# Patient Record
Sex: MALE | Race: WHITE | Employment: FULL TIME | URBAN - METROPOLITAN AREA
[De-identification: names, ages, dates, MRNs, and addresses within clinical notes are randomized per-mention and may not be internally consistent; named-entity substitution may affect disease eponyms.]

---

## 2017-02-17 NOTE — TELEPHONE ENCOUNTER
Refill Protocol Appointment Criteria  · Appointment scheduled in the past 6 months or in the next 3 months  Recent Visits       Provider Department Primary Dx    3 months ago Alesia Black MD [facility], [facility], [facility] Colon cancer scr

## 2017-06-19 ENCOUNTER — OFFICE VISIT (OUTPATIENT)
Dept: INTERNAL MEDICINE CLINIC | Facility: CLINIC | Age: 52
End: 2017-06-19

## 2017-06-19 VITALS
SYSTOLIC BLOOD PRESSURE: 149 MMHG | HEART RATE: 51 BPM | WEIGHT: 195 LBS | HEIGHT: 64.25 IN | BODY MASS INDEX: 33.29 KG/M2 | DIASTOLIC BLOOD PRESSURE: 94 MMHG

## 2017-06-19 DIAGNOSIS — R30.0 DYSURIA: ICD-10-CM

## 2017-06-19 DIAGNOSIS — Z12.11 COLON CANCER SCREENING: ICD-10-CM

## 2017-06-19 DIAGNOSIS — I51.89 DIASTOLIC DYSFUNCTION: Primary | ICD-10-CM

## 2017-06-19 DIAGNOSIS — Z00.00 ROUTINE ADULT HEALTH MAINTENANCE: ICD-10-CM

## 2017-06-19 DIAGNOSIS — Z91.09 POLLEN ALLERGIES: ICD-10-CM

## 2017-06-19 DIAGNOSIS — R53.81 MALAISE AND FATIGUE: ICD-10-CM

## 2017-06-19 DIAGNOSIS — R53.83 MALAISE AND FATIGUE: ICD-10-CM

## 2017-06-19 PROCEDURE — 99212 OFFICE O/P EST SF 10 MIN: CPT | Performed by: INTERNAL MEDICINE

## 2017-06-19 PROCEDURE — 99213 OFFICE O/P EST LOW 20 MIN: CPT | Performed by: INTERNAL MEDICINE

## 2017-06-19 RX ORDER — ASPIRIN 81 MG
TABLET, DELAYED RELEASE (ENTERIC COATED) ORAL
Refills: 1 | COMMUNITY
Start: 2017-04-18 | End: 2017-06-19

## 2017-06-19 NOTE — PROGRESS NOTES
F/u  Patient Active Problem List:     Pre-syncope     Chest pain     Diastolic dysfunction     Hypercholesterolemia     Pollen allergies     Malaise and fatigue    C/o fatigue p working 12 h d  C/o bad allergies takes calritin no relief   Did not do screen

## 2017-09-12 RX ORDER — ASPIRIN 81 MG
TABLET, DELAYED RELEASE (ENTERIC COATED) ORAL
Qty: 100 TABLET | Refills: 0 | OUTPATIENT
Start: 2017-09-12

## 2017-09-12 RX ORDER — METOPROLOL TARTRATE 50 MG/1
TABLET, FILM COATED ORAL
Qty: 60 TABLET | Refills: 2 | OUTPATIENT
Start: 2017-09-12

## 2017-10-13 RX ORDER — METOPROLOL TARTRATE 50 MG/1
TABLET, FILM COATED ORAL
Qty: 60 TABLET | Refills: 2 | OUTPATIENT
Start: 2017-10-13

## 2017-11-06 RX ORDER — ATORVASTATIN CALCIUM 10 MG/1
10 TABLET, FILM COATED ORAL DAILY
Qty: 30 TABLET | Refills: 0 | Status: SHIPPED | OUTPATIENT
Start: 2017-11-06 | End: 2017-12-01

## 2017-11-06 NOTE — TELEPHONE ENCOUNTER
Please call patient to inform the medications is refilled and to make appointment in 1 month for further refills.

## 2017-11-20 ENCOUNTER — TELEPHONE (OUTPATIENT)
Dept: INTERNAL MEDICINE CLINIC | Facility: CLINIC | Age: 52
End: 2017-11-20

## 2017-11-20 RX ORDER — METOPROLOL TARTRATE 50 MG/1
50 TABLET, FILM COATED ORAL DAILY
Qty: 30 TABLET | Refills: 0 | Status: SHIPPED | OUTPATIENT
Start: 2017-11-20 | End: 2017-12-01

## 2017-11-20 NOTE — TELEPHONE ENCOUNTER
30 day sent until seen per protocol. Left message on personal machine that the medication was sent to the pharmacy.       Hypertensive Medications  Protocol Criteria:  · Appointment scheduled in the past 6 months or in the next 3 months  · BMP or CMP in t

## 2017-11-20 NOTE — TELEPHONE ENCOUNTER
Pt is calling to request a refill for medication and states almost out of medication, has 6 days left.  Pt states wasn't aware of he had to come for a 6 month F/U and this is a busy year for him for work can he get a 1 month supply until he is able to get a

## 2017-12-01 ENCOUNTER — OFFICE VISIT (OUTPATIENT)
Dept: INTERNAL MEDICINE CLINIC | Facility: CLINIC | Age: 52
End: 2017-12-01

## 2017-12-01 VITALS
SYSTOLIC BLOOD PRESSURE: 132 MMHG | DIASTOLIC BLOOD PRESSURE: 88 MMHG | HEIGHT: 64.25 IN | HEART RATE: 54 BPM | WEIGHT: 199.19 LBS | TEMPERATURE: 99 F | BODY MASS INDEX: 34.01 KG/M2

## 2017-12-01 DIAGNOSIS — E78.2 MIXED HYPERLIPIDEMIA: ICD-10-CM

## 2017-12-01 DIAGNOSIS — I10 BENIGN ESSENTIAL HYPERTENSION: Primary | ICD-10-CM

## 2017-12-01 DIAGNOSIS — R73.01 ABNORMAL FASTING GLUCOSE: ICD-10-CM

## 2017-12-01 DIAGNOSIS — E66.9 OBESITY (BMI 30-39.9): ICD-10-CM

## 2017-12-01 PROCEDURE — 99212 OFFICE O/P EST SF 10 MIN: CPT | Performed by: INTERNAL MEDICINE

## 2017-12-01 PROCEDURE — 99214 OFFICE O/P EST MOD 30 MIN: CPT | Performed by: INTERNAL MEDICINE

## 2017-12-01 RX ORDER — ATORVASTATIN CALCIUM 10 MG/1
10 TABLET, FILM COATED ORAL DAILY
Qty: 90 TABLET | Refills: 1 | Status: SHIPPED | OUTPATIENT
Start: 2017-12-01 | End: 2017-12-31

## 2017-12-01 RX ORDER — METOPROLOL TARTRATE 50 MG/1
50 TABLET, FILM COATED ORAL DAILY
Qty: 90 TABLET | Refills: 1 | Status: SHIPPED | OUTPATIENT
Start: 2017-12-01 | End: 2018-01-19

## 2017-12-01 NOTE — PROGRESS NOTES
Chance Rust is a 45year old male. Patient presents with:  Hypertension  Hyperlipidemia      HPI:   Here to establish care, former patient of Dr Tonja Mercedes.   He has history of hypertension, hyperlipidemia, grade 1 diastolic dysfunction, obesity and fami heartburn, nausea or vomiting. : No pain on urination, change in the color of urine, discharge, urinating frequently. MUSK: No back pain, joint pain, muscle pain.   NEURO: denies tingling numbness or headaches  ENDO: No fatigue, polyuria, polydipsia, tr many times your body will feel that you are trying to starve yourself and therefore slow down your metabolism and  prevent you from losing weight. Also try and do some sort of aerobic exercise, such as brisk walks for 30 minutes 4-5 times per week.     Othe

## 2017-12-01 NOTE — PATIENT INSTRUCTIONS
Low-Salt Choices  Eating salt (sodium) can make your body retain too much water. Excess water makes your heart work harder. Canned, packaged, and frozen foods are easy to prepare. But they are often high in sodium.  Here are some ideas for low-salt foods

## 2017-12-19 NOTE — TELEPHONE ENCOUNTER
Pharmacy called to f/u on refill request   States they requested nothing in epic    aspirin 81 MG Oral Tab

## 2018-01-15 ENCOUNTER — NURSE TRIAGE (OUTPATIENT)
Dept: OTHER | Age: 53
End: 2018-01-15

## 2018-01-15 NOTE — TELEPHONE ENCOUNTER
Action Requested: Summary for Provider     []  Critical Lab, Recommendations Needed  [x] Need Additional Advice  []   FYI    []   Need Orders  [] Need Medications Sent to Pharmacy  []  Other     SUMMARY: patient's blood pressure this morning before medicat

## 2018-01-15 NOTE — TELEPHONE ENCOUNTER
Pt returned call. Reviewed recommendations with pt per doctor's instructions. Pt agreed with plan of care, states Dr. Santhosh Jesus is his PCP, appt scheduled 1/19/18.  Advised pt to call back sooner if any symptoms, pt agreed and had no further questions at this t

## 2018-01-15 NOTE — TELEPHONE ENCOUNTER
If it remains elevated in this range the BP would have to be adjusted. Please inform the patient to follow up with PCP within a week for adjusting the meds. please keep a log of blood pressure readings for next visit. Thank you.

## 2018-01-19 ENCOUNTER — OFFICE VISIT (OUTPATIENT)
Dept: INTERNAL MEDICINE CLINIC | Facility: CLINIC | Age: 53
End: 2018-01-19

## 2018-01-19 VITALS
SYSTOLIC BLOOD PRESSURE: 135 MMHG | TEMPERATURE: 99 F | HEART RATE: 50 BPM | DIASTOLIC BLOOD PRESSURE: 90 MMHG | HEIGHT: 64.25 IN | BODY MASS INDEX: 33.66 KG/M2 | WEIGHT: 197.19 LBS

## 2018-01-19 DIAGNOSIS — R73.01 ABNORMAL FASTING GLUCOSE: ICD-10-CM

## 2018-01-19 DIAGNOSIS — I10 ESSENTIAL HYPERTENSION: Primary | ICD-10-CM

## 2018-01-19 DIAGNOSIS — I51.89 DIASTOLIC DYSFUNCTION: ICD-10-CM

## 2018-01-19 DIAGNOSIS — T70.29XA ANOXIA DUE TO HIGH ALTITUDE, INITIAL ENCOUNTER: ICD-10-CM

## 2018-01-19 PROCEDURE — 99212 OFFICE O/P EST SF 10 MIN: CPT | Performed by: INTERNAL MEDICINE

## 2018-01-19 PROCEDURE — 99214 OFFICE O/P EST MOD 30 MIN: CPT | Performed by: INTERNAL MEDICINE

## 2018-01-19 RX ORDER — ACETAZOLAMIDE 125 MG/1
125 TABLET ORAL 2 TIMES DAILY
Qty: 6 TABLET | Refills: 0 | Status: SHIPPED | OUTPATIENT
Start: 2018-01-19 | End: 2018-01-22

## 2018-01-19 RX ORDER — ATORVASTATIN CALCIUM 10 MG/1
10 TABLET, FILM COATED ORAL NIGHTLY
COMMUNITY
Start: 2018-01-05 | End: 2018-05-30

## 2018-01-19 RX ORDER — LISINOPRIL 20 MG/1
20 TABLET ORAL DAILY
Qty: 30 TABLET | Refills: 0 | Status: SHIPPED | OUTPATIENT
Start: 2018-01-19 | End: 2018-02-02

## 2018-01-19 NOTE — PROGRESS NOTES
Phuong Bourgeois is a 45year old male. Patient presents with:  Hypertension  Headache      HPI:   Here for follow up of BP , his blood pressure has been running high for last 2-3 weeks. He reports sore throat and cold symptoms 2 weeks ago.  He has been ex Types: Cigarettes     Quit date: 1/1/2003  Smokeless tobacco: Never Used                      Alcohol use: Yes           0.0 oz/week     Comment: 5 drinks/week       REVIEW OF SYSTEMS:   GENERAL HEALTH: No fevers, chills, sweats, fatigue.   VISION: Gets bhavesh dose up if needed for blood pressure control. Side effects of the lisinopril including dry cough, hyperkalemia and angioedema explained to the patient. Abnormal fasting glucose  Checking hemoglobin A1c.   Patient with increased waist circumference and

## 2018-02-02 ENCOUNTER — OFFICE VISIT (OUTPATIENT)
Dept: INTERNAL MEDICINE CLINIC | Facility: CLINIC | Age: 53
End: 2018-02-02

## 2018-02-02 VITALS
BODY MASS INDEX: 33.15 KG/M2 | DIASTOLIC BLOOD PRESSURE: 92 MMHG | SYSTOLIC BLOOD PRESSURE: 136 MMHG | TEMPERATURE: 98 F | WEIGHT: 194.19 LBS | HEIGHT: 64.25 IN

## 2018-02-02 DIAGNOSIS — R73.01 ABNORMAL FASTING GLUCOSE: ICD-10-CM

## 2018-02-02 DIAGNOSIS — K21.9 GASTROESOPHAGEAL REFLUX DISEASE, ESOPHAGITIS PRESENCE NOT SPECIFIED: ICD-10-CM

## 2018-02-02 DIAGNOSIS — I10 ESSENTIAL HYPERTENSION: Primary | ICD-10-CM

## 2018-02-02 PROCEDURE — 99212 OFFICE O/P EST SF 10 MIN: CPT | Performed by: INTERNAL MEDICINE

## 2018-02-02 PROCEDURE — 99214 OFFICE O/P EST MOD 30 MIN: CPT | Performed by: INTERNAL MEDICINE

## 2018-02-02 RX ORDER — NICOTINE POLACRILEX 4 MG/1
20 GUM, CHEWING ORAL DAILY
Qty: 60 TABLET | Refills: 3 | Status: SHIPPED | OUTPATIENT
Start: 2018-02-02 | End: 2018-04-03

## 2018-02-02 RX ORDER — LISINOPRIL 40 MG/1
40 TABLET ORAL DAILY
Qty: 90 TABLET | Refills: 3 | Status: SHIPPED | OUTPATIENT
Start: 2018-02-02 | End: 2019-01-23

## 2018-02-02 NOTE — PROGRESS NOTES
Danii Benoit is a 45year old male. Patient presents with:  Hypertension: f/u      HPI:   Here is her for BP follow-up. His BP meds were changed last visit, 2 weeks ago. He was on metoprolol 50 twice daily.   Due to uncontrolled hyper tension and ty lesions or rashes. RESPIRATORY: denies shortness of breath, wheezing dry cough present. CARDIOVASCULAR: denies chest pain on exertion, palpitations, swelling in feet. GI:   GERD present.   : No pain on urination, change in the color of urine, discharge prevent reflux. Lose weight. Limit aspirin and ibuprofen. Abnormal fasting glucose  Reminded the patient to do the HbA1c order that is given last visit    Other orders  -     Omeprazole 20 MG Oral Tab EC; Take 20 mg by mouth daily.   -     lisinopril 4

## 2018-02-02 NOTE — PATIENT INSTRUCTIONS
For BP  Advised low salt diet. Eat less of canned , frozen, dried, packaged and fast food. Limit caffeine, alcohol. No smoking. Exercise regularly, at least 20 minutes 3 times a week. Maintain healthy body weight. Check BP regularly. Avoid stress.

## 2018-05-30 RX ORDER — ATORVASTATIN CALCIUM 10 MG/1
10 TABLET, FILM COATED ORAL NIGHTLY
Qty: 90 TABLET | Refills: 3 | Status: SHIPPED | OUTPATIENT
Start: 2018-05-30 | End: 2019-02-04

## 2018-05-30 NOTE — TELEPHONE ENCOUNTER
Current Outpatient Prescriptions:  atorvastatin 10 MG Oral Tab Take 10 mg by mouth nightly.    Disp:  Rfl:      QTY 30, with refills    Pharmacy having software issues-request was not received    Going out of town Monday for couple weeks

## 2018-05-30 NOTE — TELEPHONE ENCOUNTER
Please advise. No current labs.     Cholesterol Medications  Protocol Criteria:  · Appointment scheduled in the past 12 months or in the next 3 months  · ALT & LDL on file in the past 12 months  · ALT result < 80  · LDL result <130   Recent Outpatient Vis

## 2018-06-13 RX ORDER — ASPIRIN 81 MG/1
TABLET, DELAYED RELEASE ORAL
Qty: 30 TABLET | Refills: 0 | Status: SHIPPED | OUTPATIENT
Start: 2018-06-13 | End: 2018-07-14

## 2018-06-13 NOTE — TELEPHONE ENCOUNTER
Refill Protocol Appointment Criteria  · Appointment scheduled in the past 6 months or in the next 3 months  Recent Outpatient Visits            4 months ago Essential hypertension    [facility] Dixon, [facility]

## 2018-07-16 RX ORDER — ASPIRIN 81 MG/1
TABLET, DELAYED RELEASE ORAL
Qty: 30 TABLET | Refills: 3 | Status: SHIPPED | OUTPATIENT
Start: 2018-07-16 | End: 2018-11-17

## 2018-07-16 NOTE — TELEPHONE ENCOUNTER
Review pended refill request as it does not fall under a protocol.     Recent Outpatient Visits            5 months ago Essential hypertension    Humberto Baker MD    Office Visit    5 months ago Essential hypertens

## 2018-10-11 RX ORDER — OMEPRAZOLE 20 MG/1
CAPSULE, DELAYED RELEASE ORAL
Qty: 30 CAPSULE | Refills: 2 | Status: SHIPPED | OUTPATIENT
Start: 2018-10-11 | End: 2019-01-05

## 2018-10-12 NOTE — TELEPHONE ENCOUNTER
Refill Protocol Appointment Criteria  · Appointment scheduled in the past 12 months or in the next 3 months  Recent Outpatient Visits            8 months ago Essential hypertension    Pako Gavin MD    Office Vi

## 2018-11-17 RX ORDER — ASPIRIN 81 MG/1
TABLET ORAL
Qty: 30 TABLET | Refills: 2 | Status: SHIPPED | OUTPATIENT
Start: 2018-11-17 | End: 2019-02-13

## 2018-11-17 NOTE — TELEPHONE ENCOUNTER
Please review; protocol failed.     Refill Protocol Appointment Criteria  · Appointment scheduled in the past 6 months or in the next 3 months  Recent Outpatient Visits            9 months ago Essential hypertension    [facility], [facility], [address] [place] [address]

## 2019-01-07 RX ORDER — OMEPRAZOLE 20 MG/1
CAPSULE, DELAYED RELEASE ORAL
Qty: 30 CAPSULE | Refills: 2 | Status: SHIPPED | OUTPATIENT
Start: 2019-01-07 | End: 2019-03-31

## 2019-01-24 RX ORDER — LISINOPRIL 40 MG/1
TABLET ORAL
Qty: 30 TABLET | Refills: 0 | Status: SHIPPED | OUTPATIENT
Start: 2019-01-24 | End: 2019-02-23

## 2019-01-24 NOTE — TELEPHONE ENCOUNTER
Please review; protocol failed.     Hypertensive Medications  Protocol Criteria:  · Appointment scheduled in the past 6 months or in the next 3 months  · BMP or CMP in the past 12 months  · Creatinine result < 2  Recent Outpatient Visits            11 month

## 2019-02-04 ENCOUNTER — OFFICE VISIT (OUTPATIENT)
Dept: INTERNAL MEDICINE CLINIC | Facility: CLINIC | Age: 54
End: 2019-02-04
Payer: COMMERCIAL

## 2019-02-04 VITALS
HEIGHT: 64.25 IN | SYSTOLIC BLOOD PRESSURE: 122 MMHG | DIASTOLIC BLOOD PRESSURE: 84 MMHG | HEART RATE: 62 BPM | WEIGHT: 198.5 LBS | TEMPERATURE: 98 F | BODY MASS INDEX: 33.89 KG/M2

## 2019-02-04 DIAGNOSIS — E78.00 HYPERCHOLESTEROLEMIA: Primary | ICD-10-CM

## 2019-02-04 DIAGNOSIS — Z82.49 FAMILY HISTORY OF EARLY CAD: ICD-10-CM

## 2019-02-04 DIAGNOSIS — Z12.11 COLON CANCER SCREENING: ICD-10-CM

## 2019-02-04 DIAGNOSIS — Z12.5 SCREENING FOR PROSTATE CANCER: ICD-10-CM

## 2019-02-04 DIAGNOSIS — I10 BENIGN ESSENTIAL HYPERTENSION: ICD-10-CM

## 2019-02-04 PROCEDURE — 99212 OFFICE O/P EST SF 10 MIN: CPT | Performed by: INTERNAL MEDICINE

## 2019-02-04 PROCEDURE — 99214 OFFICE O/P EST MOD 30 MIN: CPT | Performed by: INTERNAL MEDICINE

## 2019-02-04 RX ORDER — ATORVASTATIN CALCIUM 20 MG/1
20 TABLET, FILM COATED ORAL NIGHTLY
Qty: 90 TABLET | Refills: 3 | Status: SHIPPED | OUTPATIENT
Start: 2019-02-04 | End: 2019-05-05

## 2019-02-05 NOTE — PROGRESS NOTES
Devan Banks is a 52year old male. Patient presents with:  Hypertension  Hyperlipidemia      HPI:     HPI  Patient is here for follow-up of chronic medical issues. Last seen in the office almost a year ago.   His blood pressure and cholesterol is unde 16.1      Smokeless tobacco: Never Used    Alcohol use: Yes      Alcohol/week: 0.0 oz      Comment: 5 drinks/week    Drug use: No       REVIEW OF SYSTEMS:   Review of Systems   Constitutional: Negative for chills, fever, malaise/fatigue and weight loss. 2018.  Goal 70. Please maintain healthy diet with low fat and low cholesterol. Avoid fried, processed food and saturated oils. Include more lean protein( beans, poultry, fish), fibers and vegetables. Exercise regularly at least 30 min 5 days a week.   Incr

## 2019-02-05 NOTE — ASSESSMENT & PLAN NOTE
Labs done in OCT at work , Scanned to [facility]  Last LDL 98 in October 2018. Goal 70. Please maintain healthy diet with low fat and low cholesterol. Avoid fried, processed food and saturated oils.  Include more lean protein( beans, poultry, fish), fibers and

## 2019-02-13 RX ORDER — ASPIRIN 81 MG/1
TABLET ORAL
Qty: 30 TABLET | Refills: 1 | Status: SHIPPED | OUTPATIENT
Start: 2019-02-13 | End: 2019-04-14

## 2019-02-13 NOTE — TELEPHONE ENCOUNTER
Please advise in regards to refill request. Thank You  Refill Protocol Appointment Criteria  · Appointment scheduled in the past 6 months or in the next 3 months  Recent Outpatient Visits            1 week ago Hypercholesterolemia    [facility]

## 2019-02-24 RX ORDER — LISINOPRIL 40 MG/1
TABLET ORAL
Qty: 30 TABLET | Refills: 0 | Status: SHIPPED | OUTPATIENT
Start: 2019-02-24 | End: 2019-03-24

## 2019-02-24 NOTE — TELEPHONE ENCOUNTER
Please review; protocol failed.     Hypertensive Medications  Protocol Criteria:  · Appointment scheduled in the past 6 months or in the next 3 months  · BMP or CMP in the past 12 months  · Creatinine result < 2  Recent Outpatient Visits            2 weeks

## 2019-03-25 RX ORDER — LISINOPRIL 40 MG/1
TABLET ORAL
Qty: 90 TABLET | Refills: 0 | Status: SHIPPED | OUTPATIENT
Start: 2019-03-25 | End: 2019-06-29

## 2019-03-31 RX ORDER — OMEPRAZOLE 20 MG/1
CAPSULE, DELAYED RELEASE ORAL
Qty: 30 CAPSULE | Refills: 1 | Status: SHIPPED | OUTPATIENT
Start: 2019-03-31 | End: 2019-05-25

## 2019-04-16 RX ORDER — ASPIRIN 81 MG/1
TABLET, COATED ORAL
Qty: 90 TABLET | Refills: 1 | Status: SHIPPED | OUTPATIENT
Start: 2019-04-16 | End: 2019-12-16

## 2019-04-16 NOTE — TELEPHONE ENCOUNTER
pls advise on refill request. RN requesting MD review.     Requested Prescriptions   Pending Prescriptions Disp Refills   • ASPIRIN LOW DOSE 81 MG Oral Tab EC [Pharmacy Med Name: Aspirin Low Dose Ec 81 Mg Tab Time] 30 tablet 0     Sig: TAKE ONE TABLET BY MO

## 2019-05-25 RX ORDER — OMEPRAZOLE 20 MG/1
CAPSULE, DELAYED RELEASE ORAL
Qty: 90 CAPSULE | Refills: 1 | Status: SHIPPED | OUTPATIENT
Start: 2019-05-25 | End: 2019-12-23

## 2019-05-25 NOTE — TELEPHONE ENCOUNTER
Refill passed per [facility] protocol.   Refill Protocol Appointment Criteria  · Appointment scheduled in the past 12 months or in the next 3 months  Recent Outpatient Visits            3 months ago Hypercholesterolemia    [facility], [facility], [address],

## 2019-07-01 RX ORDER — LISINOPRIL 40 MG/1
TABLET ORAL
Qty: 90 TABLET | Refills: 1 | Status: SHIPPED | OUTPATIENT
Start: 2019-07-01 | End: 2019-12-23

## 2019-07-31 ENCOUNTER — OFFICE VISIT (OUTPATIENT)
Dept: INTERNAL MEDICINE CLINIC | Facility: CLINIC | Age: 54
End: 2019-07-31
Payer: COMMERCIAL

## 2019-07-31 VITALS
HEART RATE: 73 BPM | SYSTOLIC BLOOD PRESSURE: 127 MMHG | BODY MASS INDEX: 32.03 KG/M2 | WEIGHT: 187.63 LBS | DIASTOLIC BLOOD PRESSURE: 81 MMHG | TEMPERATURE: 99 F | HEIGHT: 64.25 IN

## 2019-07-31 DIAGNOSIS — E78.00 HYPERCHOLESTEROLEMIA: ICD-10-CM

## 2019-07-31 DIAGNOSIS — R68.82 LOW LIBIDO: ICD-10-CM

## 2019-07-31 DIAGNOSIS — I10 BENIGN ESSENTIAL HYPERTENSION: ICD-10-CM

## 2019-07-31 DIAGNOSIS — Z12.5 SCREENING FOR PROSTATE CANCER: ICD-10-CM

## 2019-07-31 DIAGNOSIS — N52.9 ERECTILE DYSFUNCTION, UNSPECIFIED ERECTILE DYSFUNCTION TYPE: ICD-10-CM

## 2019-07-31 DIAGNOSIS — Z00.00 ANNUAL PHYSICAL EXAM: Primary | ICD-10-CM

## 2019-07-31 DIAGNOSIS — Z12.11 SCREENING FOR COLON CANCER: ICD-10-CM

## 2019-07-31 PROCEDURE — 99396 PREV VISIT EST AGE 40-64: CPT | Performed by: INTERNAL MEDICINE

## 2019-07-31 RX ORDER — ATORVASTATIN CALCIUM 20 MG/1
TABLET, FILM COATED ORAL
COMMUNITY
Start: 2019-07-26 | End: 2020-01-16

## 2019-07-31 RX ORDER — TADALAFIL 10 MG/1
10 TABLET ORAL
Qty: 10 TABLET | Refills: 2 | Status: SHIPPED | OUTPATIENT
Start: 2019-07-31 | End: 2020-01-16

## 2019-07-31 NOTE — PROGRESS NOTES
Jo Benavides is a 52year old male. No chief complaint on file. HPI:     HPI  Patient is here for physical.  History hypertension and hyperlipidemia. Blood pressure is under control. He is on lisinopril 40 mg. Hyperlipidemia on 20 mg.   Yashira Villafana sinus pain, sore throat and tinnitus. Ear muffling   Eyes: Negative for blurred vision and double vision. Respiratory: Negative for cough, sputum production, shortness of breath and wheezing.     Cardiovascular: Negative for chest pain, palpitatio DIFFERENTIAL WITH PLATELET; Future  -     COMP METABOLIC PANEL (14); Future  -     LIPID PANEL; Future  -     TSH W REFLEX TO FREE T4; Future  -     PSA, DIAGNOSTIC  Routine labs. Counseled on regular exercise and healthy diet.   Advised to wear protective

## 2019-08-06 LAB
ABSOLUTE BASOPHILS: 49 CELLS/UL (ref 0–200)
ABSOLUTE EOSINOPHILS: 153 CELLS/UL (ref 15–500)
ABSOLUTE LYMPHOCYTES: 1653 CELLS/UL (ref 850–3900)
ABSOLUTE MONOCYTES: 531 CELLS/UL (ref 200–950)
ABSOLUTE NEUTROPHILS: 3715 CELLS/UL (ref 1500–7800)
ALBUMIN/GLOBULIN RATIO: 2 (CALC) (ref 1–2.5)
ALBUMIN: 4.4 G/DL (ref 3.6–5.1)
ALKALINE PHOSPHATASE: 43 U/L (ref 40–115)
ALT: 15 U/L (ref 9–46)
AST: 13 U/L (ref 10–35)
BASOPHILS: 0.8 %
BILIRUBIN, TOTAL: 1.2 MG/DL (ref 0.2–1.2)
BUN: 14 MG/DL (ref 7–25)
CALCIUM: 9.6 MG/DL (ref 8.6–10.3)
CARBON DIOXIDE: 32 MMOL/L (ref 20–32)
CHLORIDE: 102 MMOL/L (ref 98–110)
CHOL/HDLC RATIO: 3.2 (CALC)
CHOLESTEROL, TOTAL: 138 MG/DL
CREATININE: 0.94 MG/DL (ref 0.7–1.33)
EGFR IF AFRICN AM: 105 ML/MIN/1.73M2
EGFR IF NONAFRICN AM: 91 ML/MIN/1.73M2
EOSINOPHILS: 2.5 %
FREE TESTOSTERONE: 50.6 PG/ML (ref 35–155)
GLOBULIN: 2.2 G/DL (CALC) (ref 1.9–3.7)
GLUCOSE: 93 MG/DL (ref 65–99)
HDL CHOLESTEROL: 43 MG/DL
HEMATOCRIT: 41.1 % (ref 38.5–50)
HEMOGLOBIN: 14.1 G/DL (ref 13.2–17.1)
LDL-CHOLESTEROL: 79 MG/DL (CALC)
LYMPHOCYTES: 27.1 %
MCH: 31.2 PG (ref 27–33)
MCHC: 34.3 G/DL (ref 32–36)
MCV: 90.9 FL (ref 80–100)
MONOCYTES: 8.7 %
MPV: 10.3 FL (ref 7.5–12.5)
NEUTROPHILS: 60.9 %
NON-HDL CHOLESTEROL: 95 MG/DL (CALC)
PLATELET COUNT: 218 THOUSAND/UL (ref 140–400)
POTASSIUM: 4.7 MMOL/L (ref 3.5–5.3)
PROTEIN, TOTAL: 6.6 G/DL (ref 6.1–8.1)
PSA, TOTAL: 0.4 NG/ML
RDW: 12.6 % (ref 11–15)
RED BLOOD CELL COUNT: 4.52 MILLION/UL (ref 4.2–5.8)
SODIUM: 139 MMOL/L (ref 135–146)
TESTOSTERONE, TOTAL,$/MS/MS: 378 NG/DL (ref 250–1100)
TRIGLYCERIDES: 81 MG/DL
TSH W/REFLEX TO FT4: 1.12 MIU/L (ref 0.4–4.5)
WHITE BLOOD CELL COUNT: 6.1 THOUSAND/UL (ref 3.8–10.8)

## 2019-12-17 RX ORDER — OMEPRAZOLE 20 MG/1
20 CAPSULE, DELAYED RELEASE ORAL
Qty: 90 CAPSULE | Refills: 0 | Status: CANCELLED | OUTPATIENT
Start: 2019-12-17

## 2019-12-17 NOTE — TELEPHONE ENCOUNTER
CSS, please contact patient and assist in scheduling appt to establish care with new provider (recommend Dr Melony Bumpers as replacement for Dr Nathan Patel at [facility]). "[employer]" message sent informing pt.
Patient requesting refill.     OMEPRAZOLE 20 MG Oral Capsule   ASPIRIN LOW DOSE 81 MG Oral Tab
nontender/soft/bowel sounds normal

## 2019-12-23 RX ORDER — LISINOPRIL 40 MG/1
40 TABLET ORAL
Qty: 90 TABLET | Refills: 0 | Status: SHIPPED | OUTPATIENT
Start: 2019-12-23 | End: 2020-01-16

## 2019-12-23 RX ORDER — OMEPRAZOLE 20 MG/1
20 CAPSULE, DELAYED RELEASE ORAL
Qty: 90 CAPSULE | Refills: 0 | Status: SHIPPED | OUTPATIENT
Start: 2019-12-23 | End: 2020-01-16

## 2019-12-23 NOTE — TELEPHONE ENCOUNTER
Pt calling and requests refills. Advised pt to schedule OV to establish care since Dr Enedina Heard no longer at clinic.   Pt agrees to plan and OV scheduled with Dr Ara Bacon 1/16/19    Pt is requesting refill for enough supply until OV with Dr Dago Banks.     Med

## 2020-01-16 ENCOUNTER — OFFICE VISIT (OUTPATIENT)
Dept: INTERNAL MEDICINE CLINIC | Facility: CLINIC | Age: 55
End: 2020-01-16
Payer: COMMERCIAL

## 2020-01-16 VITALS
WEIGHT: 189 LBS | SYSTOLIC BLOOD PRESSURE: 125 MMHG | HEART RATE: 67 BPM | BODY MASS INDEX: 32.27 KG/M2 | DIASTOLIC BLOOD PRESSURE: 81 MMHG | HEIGHT: 64.25 IN

## 2020-01-16 DIAGNOSIS — Z82.49 FAMILY HISTORY OF PREMATURE CAD: ICD-10-CM

## 2020-01-16 DIAGNOSIS — E66.9 OBESITY (BMI 30-39.9): ICD-10-CM

## 2020-01-16 DIAGNOSIS — I10 BENIGN ESSENTIAL HYPERTENSION: Primary | ICD-10-CM

## 2020-01-16 DIAGNOSIS — Z12.11 SCREEN FOR COLON CANCER: ICD-10-CM

## 2020-01-16 DIAGNOSIS — E78.00 HYPERCHOLESTEROLEMIA: ICD-10-CM

## 2020-01-16 PROBLEM — R73.01 ABNORMAL FASTING GLUCOSE: Status: RESOLVED | Noted: 2017-12-01 | Resolved: 2020-01-16

## 2020-01-16 PROCEDURE — 99214 OFFICE O/P EST MOD 30 MIN: CPT | Performed by: INTERNAL MEDICINE

## 2020-01-16 RX ORDER — ACETAZOLAMIDE 125 MG/1
125 TABLET ORAL 2 TIMES DAILY
Qty: 30 TABLET | Refills: 0 | Status: SHIPPED | OUTPATIENT
Start: 2020-01-16 | End: 2021-11-01

## 2020-01-16 RX ORDER — ATORVASTATIN CALCIUM 20 MG/1
20 TABLET, FILM COATED ORAL NIGHTLY
Qty: 90 TABLET | Refills: 3 | Status: SHIPPED | OUTPATIENT
Start: 2020-01-16 | End: 2020-03-18

## 2020-01-16 RX ORDER — LISINOPRIL 40 MG/1
40 TABLET ORAL
Qty: 90 TABLET | Refills: 3 | Status: SHIPPED | OUTPATIENT
Start: 2020-01-16 | End: 2020-03-18

## 2020-01-16 RX ORDER — TADALAFIL 10 MG/1
10 TABLET ORAL
Qty: 10 TABLET | Refills: 3 | Status: SHIPPED | OUTPATIENT
Start: 2020-01-16 | End: 2020-09-17

## 2020-01-16 RX ORDER — OMEPRAZOLE 20 MG/1
20 CAPSULE, DELAYED RELEASE ORAL
Qty: 90 CAPSULE | Refills: 3 | Status: SHIPPED | OUTPATIENT
Start: 2020-01-16 | End: 2020-03-18

## 2020-01-16 NOTE — PROGRESS NOTES
Sharifa Thomas is a 62year old male. Patient presents with:  Establish Care: would like acetazolamide rx  Hypertension    HPI:   68-year-old gentleman with a past medical history of hypertension, dyslipidemia, ED here for follow-up.   Overall he reports th Tobacco Use      Smoking status: Former Smoker        Packs/day: 0.75        Years: 14.00        Pack years: 10.5        Types: Cigarettes        Quit date: 2003        Years since quittin.0      Smokeless tobacco: Never Used    Alcohol use:  Yes Bowel sounds are normal.   Neurological: He is alert and oriented to person, place, and time. Psychiatric: His behavior is normal.         ASSESSMENT AND PLAN:   1. Benign essential hypertension  Well-controlled. We will continue the current regimen.   Katelyn Evangelista

## 2020-01-21 RX ORDER — ASPIRIN 81 MG/1
81 TABLET ORAL
Qty: 90 TABLET | Refills: 1 | Status: SHIPPED | OUTPATIENT
Start: 2020-01-21 | End: 2020-07-07

## 2020-02-24 ENCOUNTER — OFFICE VISIT (OUTPATIENT)
Dept: INTERNAL MEDICINE CLINIC | Facility: CLINIC | Age: 55
End: 2020-02-24
Payer: COMMERCIAL

## 2020-02-24 VITALS
BODY MASS INDEX: 32.95 KG/M2 | WEIGHT: 193 LBS | DIASTOLIC BLOOD PRESSURE: 83 MMHG | HEART RATE: 65 BPM | SYSTOLIC BLOOD PRESSURE: 127 MMHG | HEIGHT: 64.25 IN

## 2020-02-24 DIAGNOSIS — M62.830 BACK SPASM: Primary | ICD-10-CM

## 2020-02-24 PROCEDURE — 99213 OFFICE O/P EST LOW 20 MIN: CPT | Performed by: INTERNAL MEDICINE

## 2020-02-24 RX ORDER — CYCLOBENZAPRINE HCL 5 MG
5 TABLET ORAL NIGHTLY
Qty: 10 TABLET | Refills: 0 | Status: SHIPPED | OUTPATIENT
Start: 2020-02-24 | End: 2021-11-01

## 2020-02-25 NOTE — PATIENT INSTRUCTIONS
ASSESSMENT AND PLAN:   1. Back spasm  Most likely sprain. Range of motion is intact. I have given him cyclobenzaprine 5 mg to take at night. Rest, topical analgesics for 1 week. It should get better by itself.   Instructed to call me if there is no im

## 2020-02-25 NOTE — PROGRESS NOTES
Steph Young is a 62year old male. Patient presents with:  Spasms: lower R-back, onset 1 1/2 wks ago. HPI:   24-year-old gentleman here for evaluation of lower back pain on the right side started almost a week and a half ago.   He reports that while s Family History   Problem Relation Age of Onset   • Cancer Father         Stomach CA   • Hypertension Father    • Heart Disorder Father         MI   • Other (Other) Maternal Grandmother         Alzheimers   • Other (Other) Paternal Grandmother         Alz down.      ASSESSMENT AND PLAN:   1. Back spasm  Most likely sprain. Range of motion is intact. I have given him cyclobenzaprine 5 mg to take at night. Rest, topical analgesics for 1 week. It should get better by itself.   Instructed to call me if there

## 2020-03-18 RX ORDER — ATORVASTATIN CALCIUM 20 MG/1
20 TABLET, FILM COATED ORAL NIGHTLY
Qty: 90 TABLET | Refills: 1 | Status: SHIPPED | OUTPATIENT
Start: 2020-03-18 | End: 2020-09-17

## 2020-03-18 RX ORDER — LISINOPRIL 40 MG/1
40 TABLET ORAL
Qty: 90 TABLET | Refills: 1 | Status: SHIPPED | OUTPATIENT
Start: 2020-03-18 | End: 2020-09-17

## 2020-03-18 RX ORDER — OMEPRAZOLE 20 MG/1
20 CAPSULE, DELAYED RELEASE ORAL
Qty: 90 CAPSULE | Refills: 1 | Status: SHIPPED | OUTPATIENT
Start: 2020-03-18 | End: 2020-09-17

## 2020-07-07 RX ORDER — ASPIRIN 81 MG/1
TABLET, COATED ORAL
Qty: 90 TABLET | Refills: 0 | Status: SHIPPED | OUTPATIENT
Start: 2020-07-07 | End: 2020-11-19

## 2020-07-15 ENCOUNTER — TELEPHONE (OUTPATIENT)
Dept: INTERNAL MEDICINE CLINIC | Facility: CLINIC | Age: 55
End: 2020-07-15

## 2020-07-15 NOTE — TELEPHONE ENCOUNTER
Spoke with patient ( verified)--needs to cancel tomorrow's appt for 6 month f/u d/t work schedule.  Patient denies any symptoms at this time    Patient then requesting to re-schedule appt in October, \"because I'm pretty busy--even my September schedule

## 2020-07-16 NOTE — TELEPHONE ENCOUNTER
It is okay, I can see him in October. Please remind patient to make appointment for colonoscopy. It is extremely important.   Please let patient know thank you

## 2020-07-16 NOTE — TELEPHONE ENCOUNTER
Pt has been informed that it is ok to wait for appt until October, pt was reminded to schedule colonoscopy, pt stated that he will call to schedule appt with GI.

## 2020-09-17 ENCOUNTER — OFFICE VISIT (OUTPATIENT)
Dept: INTERNAL MEDICINE CLINIC | Facility: CLINIC | Age: 55
End: 2020-09-17
Payer: COMMERCIAL

## 2020-09-17 VITALS
HEART RATE: 54 BPM | HEIGHT: 64.25 IN | BODY MASS INDEX: 31.92 KG/M2 | SYSTOLIC BLOOD PRESSURE: 125 MMHG | DIASTOLIC BLOOD PRESSURE: 86 MMHG | WEIGHT: 187 LBS

## 2020-09-17 DIAGNOSIS — F41.9 ANXIETY: ICD-10-CM

## 2020-09-17 DIAGNOSIS — Z12.11 SCREEN FOR COLON CANCER: ICD-10-CM

## 2020-09-17 DIAGNOSIS — Z00.00 ADULT GENERAL MEDICAL EXAM: Primary | ICD-10-CM

## 2020-09-17 PROCEDURE — 3008F BODY MASS INDEX DOCD: CPT | Performed by: INTERNAL MEDICINE

## 2020-09-17 PROCEDURE — 3079F DIAST BP 80-89 MM HG: CPT | Performed by: INTERNAL MEDICINE

## 2020-09-17 PROCEDURE — 3074F SYST BP LT 130 MM HG: CPT | Performed by: INTERNAL MEDICINE

## 2020-09-17 PROCEDURE — 99396 PREV VISIT EST AGE 40-64: CPT | Performed by: INTERNAL MEDICINE

## 2020-09-17 RX ORDER — LISINOPRIL 40 MG/1
40 TABLET ORAL
Qty: 90 TABLET | Refills: 1 | Status: SHIPPED | OUTPATIENT
Start: 2020-09-17 | End: 2021-04-06

## 2020-09-17 RX ORDER — OMEPRAZOLE 20 MG/1
20 CAPSULE, DELAYED RELEASE ORAL
Qty: 90 CAPSULE | Refills: 1 | Status: SHIPPED | OUTPATIENT
Start: 2020-09-17 | End: 2021-04-06

## 2020-09-17 RX ORDER — ATORVASTATIN CALCIUM 20 MG/1
20 TABLET, FILM COATED ORAL NIGHTLY
Qty: 90 TABLET | Refills: 1 | Status: SHIPPED | OUTPATIENT
Start: 2020-09-17 | End: 2021-04-06

## 2020-09-17 RX ORDER — ALPRAZOLAM 0.25 MG/1
0.25 TABLET ORAL NIGHTLY PRN
Qty: 30 TABLET | Refills: 0 | Status: SHIPPED | OUTPATIENT
Start: 2020-09-17 | End: 2020-10-17

## 2020-09-17 RX ORDER — TADALAFIL 10 MG/1
10 TABLET ORAL
Qty: 10 TABLET | Refills: 3 | Status: SHIPPED | OUTPATIENT
Start: 2020-09-17 | End: 2021-09-27

## 2020-09-17 NOTE — PROGRESS NOTES
Sita Pollock is a 62year old male. Patient presents with:  Physical: annual exam    HPI:   47-year-old gentleman here for physical.  Overall he reported he is doing well. He has been stressed out because of his work situation.   The stress at work is af Procedure Laterality Date   • OTHER SURGICAL HISTORY      deviated septum   • OTHER SURGICAL HISTORY Left     knee arthroscopy - meniscal tear   • TONSILLECTOMY        Social History:  Social History    Tobacco Use      Smoking status: Former Smoker kg)  02/04/19 : 198 lb 8 oz (90 kg)    Body mass index is 31.85 kg/m².       EXAM:   /86 (BP Location: Right arm, Patient Position: Sitting, Cuff Size: adult)   Pulse 54   Ht 5' 4.25\" (1.632 m)   Wt 187 lb (84.8 kg)   BMI 31.85 kg/m²   Physical Exam score.  He has not completed that yet. Encourage patient to continue to exercise and keep an eye on the diet. Continue with the statins for now. Anxiety-discussed non-pharmacotherapy techniques. Provided with Xanax 0.25 mg 30 tablets.   Instructed pat

## 2020-09-18 ENCOUNTER — TELEPHONE (OUTPATIENT)
Dept: INTERNAL MEDICINE CLINIC | Facility: CLINIC | Age: 55
End: 2020-09-18

## 2020-09-18 RX ORDER — FLUOXETINE 10 MG/1
10 CAPSULE ORAL DAILY
Qty: 30 CAPSULE | Refills: 0 | Status: SHIPPED | OUTPATIENT
Start: 2020-09-18 | End: 2020-11-08

## 2020-09-18 NOTE — TELEPHONE ENCOUNTER
Patient seen in office 09/17  Was prescribed xanax 0.25 mg  Patient states he would like to try prozac medication instead.  Please advise.      ----- Message from Jesika Harden sent at 9/17/2020  6:23 PM CDT -----  Regarding: Prescription Question  Contac

## 2020-11-08 RX ORDER — FLUOXETINE 10 MG/1
10 CAPSULE ORAL DAILY
Qty: 30 CAPSULE | Refills: 0 | Status: SHIPPED | OUTPATIENT
Start: 2020-11-08 | End: 2020-12-17

## 2020-11-20 RX ORDER — ASPIRIN 81 MG/1
81 TABLET ORAL DAILY
Qty: 90 TABLET | Refills: 0 | Status: SHIPPED | OUTPATIENT
Start: 2020-11-20 | End: 2020-12-17

## 2020-11-20 NOTE — TELEPHONE ENCOUNTER
I have approved his medication. Please remind patient to complete his colonoscopy. If he is refusing colonoscopy, please mail him a stool card so that he can complete that as soon as possible.

## 2020-12-17 RX ORDER — FLUOXETINE 10 MG/1
10 CAPSULE ORAL DAILY
Qty: 30 CAPSULE | Refills: 0 | Status: SHIPPED | OUTPATIENT
Start: 2020-12-17 | End: 2021-01-18

## 2020-12-17 RX ORDER — ASPIRIN 81 MG/1
81 TABLET ORAL DAILY
Qty: 90 TABLET | Refills: 0 | Status: SHIPPED | OUTPATIENT
Start: 2020-12-17 | End: 2021-04-06

## 2021-01-18 RX ORDER — FLUOXETINE 10 MG/1
10 CAPSULE ORAL DAILY
Qty: 30 CAPSULE | Refills: 0 | Status: SHIPPED | OUTPATIENT
Start: 2021-01-18 | End: 2021-02-21

## 2021-02-21 RX ORDER — FLUOXETINE 10 MG/1
10 CAPSULE ORAL DAILY
Qty: 30 CAPSULE | Refills: 0 | Status: SHIPPED | OUTPATIENT
Start: 2021-02-21 | End: 2021-04-06

## 2021-04-06 RX ORDER — LISINOPRIL 40 MG/1
TABLET ORAL
Qty: 90 TABLET | Refills: 1 | Status: SHIPPED | OUTPATIENT
Start: 2021-04-06 | End: 2021-11-01

## 2021-04-06 RX ORDER — FLUOXETINE 10 MG/1
CAPSULE ORAL
Qty: 30 CAPSULE | Refills: 0 | Status: SHIPPED | OUTPATIENT
Start: 2021-04-06 | End: 2021-05-11

## 2021-04-06 RX ORDER — OMEPRAZOLE 20 MG/1
CAPSULE, DELAYED RELEASE ORAL
Qty: 90 CAPSULE | Refills: 1 | Status: SHIPPED | OUTPATIENT
Start: 2021-04-06 | End: 2021-11-01

## 2021-04-06 RX ORDER — ATORVASTATIN CALCIUM 20 MG/1
TABLET, FILM COATED ORAL
Qty: 90 TABLET | Refills: 1 | Status: SHIPPED | OUTPATIENT
Start: 2021-04-06 | End: 2021-11-01

## 2021-04-06 RX ORDER — ASPIRIN 81 MG/1
TABLET ORAL
Qty: 90 TABLET | Refills: 0 | Status: SHIPPED | OUTPATIENT
Start: 2021-04-06

## 2021-05-11 RX ORDER — FLUOXETINE 10 MG/1
CAPSULE ORAL
Qty: 30 CAPSULE | Refills: 0 | Status: SHIPPED | OUTPATIENT
Start: 2021-05-11 | End: 2021-06-22

## 2021-06-22 RX ORDER — FLUOXETINE 10 MG/1
CAPSULE ORAL
Qty: 30 CAPSULE | Refills: 0 | Status: SHIPPED | OUTPATIENT
Start: 2021-06-22 | End: 2021-08-03

## 2021-08-03 RX ORDER — FLUOXETINE 10 MG/1
CAPSULE ORAL
Qty: 30 CAPSULE | Refills: 0 | Status: SHIPPED | OUTPATIENT
Start: 2021-08-03 | End: 2021-09-22

## 2021-09-22 RX ORDER — FLUOXETINE 10 MG/1
CAPSULE ORAL
Qty: 30 CAPSULE | Refills: 0 | Status: SHIPPED | OUTPATIENT
Start: 2021-09-22 | End: 2021-11-01

## 2021-09-22 NOTE — TELEPHONE ENCOUNTER
Last seen in September 2020. Medication approved for 30 days. He needs an appointment before next refill.   Thank you

## 2021-09-27 RX ORDER — TADALAFIL 10 MG/1
10 TABLET ORAL
Qty: 10 TABLET | Refills: 3 | OUTPATIENT
Start: 2021-09-27 | End: 2021-11-01

## 2021-09-27 NOTE — TELEPHONE ENCOUNTER
Refill passed per [facility] protocol. Requested Prescriptions   Pending Prescriptions Disp Refills    Tadalafil 10 MG Oral Tab 10 tablet 3     Sig: Take 1 tablet (10 mg total) by mouth daily as needed for Erectile Dysfunction. Genitourinary Medications Passed - 9/27/2021  1:14 PM        Passed - Patient does not have pulmonary hypertension on problem list        Passed - Appointment in the past 12 or next 3 months              Future Appointments         Provider Department Appt Notes    In 1 month Julio C Lenz MD [facility], [address] [place] [address] tiredness, referral for colonoscopy and whatever labs are necessary.   New prescriptions            Recent Outpatient Visits              1 year ago [facility]    [facility]    1 year ago Back spasm    [address], Janeen Scales MD    Office Visit    1 year ago Benign essential hypertension    [facility], Janeen Scales MD    Office Visit    2 years ago Annual physical exam    Gay Pugh MD    Office Visit    2 years ago Hypercholesterolemia    [facility], Neal Winters MD    Office Visit

## 2021-11-01 ENCOUNTER — OFFICE VISIT (OUTPATIENT)
Dept: INTERNAL MEDICINE CLINIC | Facility: CLINIC | Age: 56
End: 2021-11-01
Payer: COMMERCIAL

## 2021-11-01 VITALS
RESPIRATION RATE: 14 BRPM | BODY MASS INDEX: 33.29 KG/M2 | DIASTOLIC BLOOD PRESSURE: 80 MMHG | SYSTOLIC BLOOD PRESSURE: 120 MMHG | WEIGHT: 195 LBS | OXYGEN SATURATION: 98 % | HEIGHT: 64.25 IN | HEART RATE: 92 BPM

## 2021-11-01 DIAGNOSIS — Z12.11 SCREEN FOR COLON CANCER: ICD-10-CM

## 2021-11-01 DIAGNOSIS — Z00.00 ADULT GENERAL MEDICAL EXAM: Primary | ICD-10-CM

## 2021-11-01 PROCEDURE — 3079F DIAST BP 80-89 MM HG: CPT | Performed by: INTERNAL MEDICINE

## 2021-11-01 PROCEDURE — 3074F SYST BP LT 130 MM HG: CPT | Performed by: INTERNAL MEDICINE

## 2021-11-01 PROCEDURE — 99396 PREV VISIT EST AGE 40-64: CPT | Performed by: INTERNAL MEDICINE

## 2021-11-01 PROCEDURE — 3008F BODY MASS INDEX DOCD: CPT | Performed by: INTERNAL MEDICINE

## 2021-11-01 RX ORDER — OMEPRAZOLE 20 MG/1
20 CAPSULE, DELAYED RELEASE ORAL DAILY
Qty: 90 CAPSULE | Refills: 1 | Status: SHIPPED | OUTPATIENT
Start: 2021-11-01

## 2021-11-01 RX ORDER — LISINOPRIL 40 MG/1
40 TABLET ORAL DAILY
Qty: 90 TABLET | Refills: 1 | Status: SHIPPED | OUTPATIENT
Start: 2021-11-01

## 2021-11-01 RX ORDER — ATORVASTATIN CALCIUM 20 MG/1
20 TABLET, FILM COATED ORAL NIGHTLY
Qty: 90 TABLET | Refills: 1 | Status: SHIPPED | OUTPATIENT
Start: 2021-11-01

## 2021-11-01 RX ORDER — FLUOXETINE 10 MG/1
10 CAPSULE ORAL DAILY
Qty: 90 CAPSULE | Refills: 1 | Status: SHIPPED | OUTPATIENT
Start: 2021-11-01

## 2021-11-01 RX ORDER — TADALAFIL 10 MG/1
10 TABLET ORAL
Qty: 30 TABLET | Refills: 1 | Status: SHIPPED | OUTPATIENT
Start: 2021-11-01 | End: 2021-12-01

## 2021-11-01 NOTE — PROGRESS NOTES
Jordin Isidro is a 60year old male. Patient presents with:  Physical  Referral: Will like to see GI for his Colonoscopy     HPI:   25-year-old gentleman with medical history significant for hypertension, dyslipidemia, GERD here for physical.  He reports hypertension    • Hypercholesterolemia 11/14/2016   • Hyperlipidemia       Past Surgical History:   Procedure Laterality Date   • OTHER SURGICAL HISTORY      deviated septum   • OTHER SURGICAL HISTORY Left     knee arthroscopy - meniscal tear   • TONSILLEC Appearance: Normal appearance. HENT:      Head: Normocephalic. Eyes:      Conjunctiva/sclera: Conjunctivae normal.   Cardiovascular:      Rate and Rhythm: Normal rate and regular rhythm. Heart sounds: Normal heart sounds. No murmur heard.   Pulmona indicates understanding of these issues and agrees to the plan. No follow-ups on file. This note was prepared using [employer] [place] W4 voice recognition dictation software. As a result errors may occur. When identified these errors have been corrected.  C/ Thom Underwood

## 2021-11-29 ENCOUNTER — NURSE ONLY (OUTPATIENT)
Dept: GASTROENTEROLOGY | Facility: CLINIC | Age: 56
End: 2021-11-29

## 2021-11-29 DIAGNOSIS — Z12.12 SCREENING FOR COLORECTAL CANCER: Primary | ICD-10-CM

## 2021-11-29 DIAGNOSIS — Z12.11 SCREENING FOR COLORECTAL CANCER: Primary | ICD-10-CM

## 2021-11-29 NOTE — PROGRESS NOTES
Steffanie Mathews patient for his scheduled telephone colon screening.      PCP visit on 11/1/2021 and referred to GI for his 1st colonoscopy     Anticoagulants: no   Diabetic Meds: no   BP meds(Ace inhibitors/ARB's): lisinopril   Weight loss meds (phent

## 2021-11-30 RX ORDER — POLYETHYLENE GLYCOL 3350, SODIUM CHLORIDE, SODIUM BICARBONATE, POTASSIUM CHLORIDE 420; 11.2; 5.72; 1.48 G/4L; G/4L; G/4L; G/4L
POWDER, FOR SOLUTION ORAL
Qty: 4000 ML | Refills: 0 | Status: SHIPPED | OUTPATIENT
Start: 2021-11-30 | End: 2021-12-08

## 2021-11-30 NOTE — PROGRESS NOTES
PPD- FYI    Future Appointments   Date Time Provider Padma King   12/8/2021 10:15 AM DANE, PROCEDURE [id number] None

## 2021-11-30 NOTE — PROGRESS NOTES
Scheduled for:  Colonoscopy 287[address]    Provider Name:  Dr. Basilio Room   Date:  12/8/2021  Location:  [facility]  Sedation:  MAC  Time:  10:15 am (pt is aware to arrive at 9:15 am)  Prep:  Split dose trilyte  Meds/Allergies Reconciled?: Angelita Rosen

## 2021-11-30 NOTE — PROGRESS NOTES
Scheduling:    cln w/ laurie w/ laurie w/ Dr. Haley Nelson  Dx: Jose Guadalupe Lindsay screening  trilyte split dose sent e-scribe  Hold lisinopril day of procedure

## 2021-12-02 NOTE — PROGRESS NOTES
Referral was authorized per managed care. Per [employer]'s precertification list, no PA required for colonoscopy.

## 2021-12-05 ENCOUNTER — LAB ENCOUNTER (OUTPATIENT)
Dept: LAB | Facility: HOSPITAL | Age: 56
End: 2021-12-05
Attending: INTERNAL MEDICINE
Payer: COMMERCIAL

## 2021-12-05 DIAGNOSIS — Z01.818 PRE-OP TESTING: ICD-10-CM

## 2021-12-08 ENCOUNTER — HOSPITAL ENCOUNTER (OUTPATIENT)
Facility: HOSPITAL | Age: 56
Setting detail: HOSPITAL OUTPATIENT SURGERY
Discharge: HOME | End: 2021-12-08
Attending: INTERNAL MEDICINE | Admitting: INTERNAL MEDICINE
Payer: COMMERCIAL

## 2021-12-08 ENCOUNTER — ANESTHESIA (OUTPATIENT)
Dept: ENDOSCOPY | Facility: HOSPITAL | Age: 56
End: 2021-12-08
Payer: COMMERCIAL

## 2021-12-08 ENCOUNTER — ANESTHESIA EVENT (OUTPATIENT)
Dept: ENDOSCOPY | Facility: HOSPITAL | Age: 56
End: 2021-12-08
Payer: COMMERCIAL

## 2021-12-08 VITALS
HEIGHT: 65 IN | TEMPERATURE: 98 F | BODY MASS INDEX: 32.49 KG/M2 | RESPIRATION RATE: 13 BRPM | OXYGEN SATURATION: 99 % | SYSTOLIC BLOOD PRESSURE: 116 MMHG | WEIGHT: 195 LBS | DIASTOLIC BLOOD PRESSURE: 77 MMHG | HEART RATE: 61 BPM

## 2021-12-08 DIAGNOSIS — Z01.818 PRE-OP TESTING: Primary | ICD-10-CM

## 2021-12-08 DIAGNOSIS — Z12.12 SCREENING FOR COLORECTAL CANCER: ICD-10-CM

## 2021-12-08 DIAGNOSIS — Z12.11 SCREENING FOR COLORECTAL CANCER: ICD-10-CM

## 2021-12-08 PROCEDURE — 0DBN8ZX EXCISION OF SIGMOID COLON, VIA NATURAL OR ARTIFICIAL OPENING ENDOSCOPIC, DIAGNOSTIC: ICD-10-PCS | Performed by: INTERNAL MEDICINE

## 2021-12-08 PROCEDURE — 0DBK8ZX EXCISION OF ASCENDING COLON, VIA NATURAL OR ARTIFICIAL OPENING ENDOSCOPIC, DIAGNOSTIC: ICD-10-PCS | Performed by: INTERNAL MEDICINE

## 2021-12-08 PROCEDURE — 45385 COLONOSCOPY W/LESION REMOVAL: CPT | Performed by: INTERNAL MEDICINE

## 2021-12-08 RX ORDER — LIDOCAINE HYDROCHLORIDE 10 MG/ML
INJECTION, SOLUTION EPIDURAL; INFILTRATION; INTRACAUDAL; PERINEURAL AS NEEDED
Status: DISCONTINUED | OUTPATIENT
Start: 2021-12-08 | End: 2021-12-08 | Stop reason: SURG

## 2021-12-08 RX ORDER — SODIUM CHLORIDE, SODIUM LACTATE, POTASSIUM CHLORIDE, CALCIUM CHLORIDE 600; 310; 30; 20 MG/100ML; MG/100ML; MG/100ML; MG/100ML
INJECTION, SOLUTION INTRAVENOUS CONTINUOUS
Status: DISCONTINUED | OUTPATIENT
Start: 2021-12-08 | End: 2021-12-08

## 2021-12-08 RX ADMIN — SODIUM CHLORIDE, SODIUM LACTATE, POTASSIUM CHLORIDE, CALCIUM CHLORIDE: 600; 310; 30; 20 INJECTION, SOLUTION INTRAVENOUS at 10:18:00

## 2021-12-08 RX ADMIN — LIDOCAINE HYDROCHLORIDE 40 MG: 10 INJECTION, SOLUTION EPIDURAL; INFILTRATION; INTRACAUDAL; PERINEURAL at 10:23:00

## 2021-12-08 RX ADMIN — SODIUM CHLORIDE, SODIUM LACTATE, POTASSIUM CHLORIDE, CALCIUM CHLORIDE: 600; 310; 30; 20 INJECTION, SOLUTION INTRAVENOUS at 10:45:00

## 2021-12-08 NOTE — ANESTHESIA POSTPROCEDURE EVALUATION
Patient: Lana Eugene    Procedure Summary     Date: 12/08/21 Room / Location: [address] [place] [address] ENDOSCOPY 01 / [address] [place] [address] ENDOSCOPY    Anesthesia Start: 4637 Anesthesia Stop: 0213    Procedure: COLONOSCOPY (N/A ) Diagnosis:       Screening for colorectal cancer      (Col

## 2021-12-08 NOTE — OPERATIVE REPORT
COLONOSCOPY REPORT    Bryan Hansen     1965 Age 60year old   PCP Evgeny Hassan MD Endoscopist Marcelino Jung MD     Date of procedure: 21    Procedure: Colonoscopy w/cold snare polypectomy    Pre-operative diagnosis: Screening appeared normal.    4. A retroflexed view of the rectum revealed small internal hemorrhoids. 5. The colonic mucosa throughout the colon showed normal vascular pattern, without evidence of angioectasias or inflammation.      6. ROSSI: normal rectal tone, no

## 2021-12-08 NOTE — H&P
History & Physical Examination    Patient Name: Vitaly Chilel  MRN: [id number]  CSN: [id number]  YOB: 1965    Diagnosis: screening for colon cancer    Patient currently denies any GI symptoms of nausea, vomiting, dyspepsia, dysphagia, hem Age of Onset   • Cancer Father         Stomach CA   • Hypertension Father    • Heart Disorder Father         MI   • Other (Other) Maternal Grandmother         Alzheimers   • Other (Other) Paternal Grandmother         Alzheimers   • Heart Disorder Brother

## 2021-12-08 NOTE — ANESTHESIA PREPROCEDURE EVALUATION
Anesthesia PreOp Note    HPI:     Bryan Hansen is a 60year old male who presents for preoperative consultation requested by: Sierra Zambrano MD    Date of Surgery: 12/8/2021    Procedure(s):  COLONOSCOPY  Indication: Screening for colorectal cancer Rfl: 1, 12/7/2021 at 0800  FLUoxetine 10 MG Oral Cap, Take 1 capsule (10 mg total) by mouth daily. , Disp: 90 capsule, Rfl: 1, 12/7/2021 at 0800  ASPIRIN ADULT LOW STRENGTH 81 MG Oral Tab EC, Take 1 tablet (81 mg total) by mouth daily.   (Patient taking diff Vital Signs: Body mass index is 32.45 kg/m². height is 1.651 m (5' 5\") and weight is 88.5 kg (195 lb). His tympanic temperature is 98 °F (36.7 °C). His blood pressure is 136/94 (abnormal) and his pulse is 68.  His respiration is 18 and oxygen satura

## 2021-12-13 ENCOUNTER — TELEPHONE (OUTPATIENT)
Dept: GASTROENTEROLOGY | Facility: CLINIC | Age: 56
End: 2021-12-13

## 2021-12-14 NOTE — TELEPHONE ENCOUNTER
Health maintenance updated. Last colonoscopy done 12/8/21. 5 year recall placed into Pt Outreach, next due on 12/8/26 per Dr. Cedric Alvarado.

## 2021-12-14 NOTE — TELEPHONE ENCOUNTER
Left message for patient, repeat cLN in 5 years. Given R sided hyperplastic polyp.      GI staff: please place recall for colonoscopy in 5 years

## 2022-02-21 ENCOUNTER — MED REC SCAN ONLY (OUTPATIENT)
Dept: INTERNAL MEDICINE CLINIC | Facility: CLINIC | Age: 57
End: 2022-02-21

## 2022-05-13 ENCOUNTER — MED REC SCAN ONLY (OUTPATIENT)
Dept: INTERNAL MEDICINE CLINIC | Facility: CLINIC | Age: 57
End: 2022-05-13

## 2022-06-01 ENCOUNTER — TELEPHONE (OUTPATIENT)
Dept: INTERNAL MEDICINE CLINIC | Facility: CLINIC | Age: 57
End: 2022-06-01

## 2022-06-01 RX ORDER — FLUOXETINE 10 MG/1
10 CAPSULE ORAL DAILY
Qty: 90 CAPSULE | Refills: 1 | Status: SHIPPED | OUTPATIENT
Start: 2022-06-01

## 2022-06-01 RX ORDER — LISINOPRIL 40 MG/1
40 TABLET ORAL DAILY
Qty: 90 TABLET | Refills: 1 | Status: SHIPPED | OUTPATIENT
Start: 2022-06-01

## 2022-06-01 RX ORDER — OMEPRAZOLE 20 MG/1
20 CAPSULE, DELAYED RELEASE ORAL DAILY
Qty: 90 CAPSULE | Refills: 1 | Status: SHIPPED | OUTPATIENT
Start: 2022-06-01

## 2022-06-01 RX ORDER — ATORVASTATIN CALCIUM 20 MG/1
20 TABLET, FILM COATED ORAL NIGHTLY
Qty: 90 TABLET | Refills: 1 | Status: SHIPPED | OUTPATIENT
Start: 2022-06-01

## 2022-06-01 NOTE — TELEPHONE ENCOUNTER
Routed to Dr Alonso Dejesus for advise on atorvastatin, lisinopril,  thanks.         Refill passed per [facility] protocol   Requested Prescriptions   Pending Prescriptions Disp Refills    FLUOXETINE 10 MG Oral Cap [Pharmacy Med Name: FLUOXETIN(P) CAP 10MG] 30 capsule 0     Sig: TAKE 1 CAPSULE DAILY        Psychiatric Non-Scheduled (Anti-Anxiety) Passed - 6/1/2022  1:51 PM        Passed - Appointment in last 6 or next 3 months           ATORVASTATIN 20 MG Oral Tab [Pharmacy Med Name: ATORVASTATIN TAB 20MG] 30 tablet 0     Sig: TAKE 1 TABLET NIGHTLY        Cholesterol Medication Protocol Failed - 6/1/2022  1:51 PM        Failed - ALT in past 12 months        Failed - LDL in past 12 months        Failed - Last ALT < 80       Lab Results   Component Value Date    ALT 15 08/01/2019             Failed - Last LDL < 130     Lab Results   Component Value Date    LDL 79 08/01/2019               Passed - Appointment in past 12 or next 3 months           LISINOPRIL 40 MG Oral Tab [Pharmacy Med Name: LISINOPRIL TAB 40MG] 30 tablet 0     Sig: TAKE 1 TABLET DAILY        Hypertensive Medications Protocol Failed - 6/1/2022  1:51 PM        Failed - CMP or BMP in past 12 months        Passed - Appointment in past 6 or next 3 months        Passed - GFR Non- > 50     Lab Results   Component Value Date    GFRNAA 91 08/01/2019                    OMEPRAZOLE 20 MG Oral Capsule Delayed Release [Pharmacy Med Name: OMEPRAZOL RX CAP 20MG] 30 capsule 0     Sig: TAKE 1 CAPSULE DAILY        Gastrointestional Medication Protocol Passed - 6/1/2022  1:51 PM        Passed - Appointment in past 12 or next 3 months

## 2022-06-01 NOTE — TELEPHONE ENCOUNTER
Patient needs Rx refills below sent to [facility] in [place] on file: Omeprazole, Lisinopril, Atorvastatin, and Fluoxetine Out of medications. Just send one month and then he can mail order the meds after seeing Dr. Paul Hou. Patient scheduled for 7-14. Call with status.

## 2022-06-20 RX ORDER — LISINOPRIL 40 MG/1
40 TABLET ORAL DAILY
Qty: 90 TABLET | Refills: 1 | Status: SHIPPED | OUTPATIENT
Start: 2022-06-20

## 2022-06-20 RX ORDER — OMEPRAZOLE 20 MG/1
20 CAPSULE, DELAYED RELEASE ORAL DAILY
Qty: 90 CAPSULE | Refills: 1 | Status: SHIPPED | OUTPATIENT
Start: 2022-06-20

## 2022-06-20 RX ORDER — FLUOXETINE 10 MG/1
10 CAPSULE ORAL DAILY
Qty: 90 CAPSULE | Refills: 1 | Status: SHIPPED | OUTPATIENT
Start: 2022-06-20

## 2022-06-20 RX ORDER — ATORVASTATIN CALCIUM 20 MG/1
20 TABLET, FILM COATED ORAL NIGHTLY
Qty: 90 TABLET | Refills: 1 | Status: SHIPPED | OUTPATIENT
Start: 2022-06-20

## 2022-06-27 ENCOUNTER — MED REC SCAN ONLY (OUTPATIENT)
Dept: INTERNAL MEDICINE CLINIC | Facility: CLINIC | Age: 57
End: 2022-06-27

## 2022-08-08 ENCOUNTER — MED REC SCAN ONLY (OUTPATIENT)
Dept: INTERNAL MEDICINE CLINIC | Facility: CLINIC | Age: 57
End: 2022-08-08

## 2022-09-07 ENCOUNTER — TELEPHONE (OUTPATIENT)
Dept: INTERNAL MEDICINE CLINIC | Facility: CLINIC | Age: 57
End: 2022-09-07

## 2022-09-07 RX ORDER — OMEPRAZOLE 20 MG/1
20 CAPSULE, DELAYED RELEASE ORAL DAILY
Qty: 90 CAPSULE | Refills: 0 | Status: SHIPPED | OUTPATIENT
Start: 2022-09-07

## 2022-09-07 RX ORDER — ATORVASTATIN CALCIUM 20 MG/1
20 TABLET, FILM COATED ORAL NIGHTLY
Qty: 90 TABLET | Refills: 0 | Status: SHIPPED | OUTPATIENT
Start: 2022-09-07

## 2022-09-07 RX ORDER — FLUOXETINE 10 MG/1
10 CAPSULE ORAL DAILY
Qty: 90 CAPSULE | Refills: 0 | Status: SHIPPED | OUTPATIENT
Start: 2022-09-07

## 2022-09-07 RX ORDER — LISINOPRIL 40 MG/1
40 TABLET ORAL DAILY
Qty: 90 TABLET | Refills: 0 | Status: SHIPPED | OUTPATIENT
Start: 2022-09-07

## 2022-09-07 NOTE — TELEPHONE ENCOUNTER
Spoke to patient and informed him that his refills were sent [facility] [place]

## 2022-09-07 NOTE — TELEPHONE ENCOUNTER
Patient is calling to request the following medications refills to be sent this one time to the [facility] in [place] as he forgot his medications and is out of town. Atorvastatin  Lisinopril  Omeprazole  FLUoxetine    Patient is requesting a call back to confirm prescriptions have been sent to this new pharmacy selected.

## 2022-09-29 ENCOUNTER — TELEPHONE (OUTPATIENT)
Dept: INTERNAL MEDICINE CLINIC | Facility: CLINIC | Age: 57
End: 2022-09-29

## 2022-09-29 ENCOUNTER — OFFICE VISIT (OUTPATIENT)
Facility: CLINIC | Age: 57
End: 2022-09-29

## 2022-09-29 VITALS
HEIGHT: 65 IN | SYSTOLIC BLOOD PRESSURE: 122 MMHG | DIASTOLIC BLOOD PRESSURE: 80 MMHG | RESPIRATION RATE: 14 BRPM | WEIGHT: 197 LBS | HEART RATE: 74 BPM | BODY MASS INDEX: 32.82 KG/M2 | OXYGEN SATURATION: 97 %

## 2022-09-29 DIAGNOSIS — Z12.5 SCREENING PSA (PROSTATE SPECIFIC ANTIGEN): ICD-10-CM

## 2022-09-29 DIAGNOSIS — Z00.00 ADULT GENERAL MEDICAL EXAM: Primary | ICD-10-CM

## 2022-09-29 DIAGNOSIS — I10 BENIGN ESSENTIAL HYPERTENSION: ICD-10-CM

## 2022-09-29 PROBLEM — M47.26 OSTEOARTHRITIS OF SPINE WITH RADICULOPATHY, LUMBAR REGION: Status: ACTIVE | Noted: 2022-09-29

## 2022-09-29 PROCEDURE — 3008F BODY MASS INDEX DOCD: CPT | Performed by: INTERNAL MEDICINE

## 2022-09-29 PROCEDURE — 3079F DIAST BP 80-89 MM HG: CPT | Performed by: INTERNAL MEDICINE

## 2022-09-29 PROCEDURE — 99396 PREV VISIT EST AGE 40-64: CPT | Performed by: INTERNAL MEDICINE

## 2022-09-29 PROCEDURE — 3074F SYST BP LT 130 MM HG: CPT | Performed by: INTERNAL MEDICINE

## 2022-09-29 RX ORDER — GABAPENTIN 600 MG/1
600 TABLET ORAL 2 TIMES DAILY
COMMUNITY
Start: 2022-08-15

## 2022-09-29 RX ORDER — NAPROXEN 500 MG/1
TABLET ORAL
COMMUNITY
Start: 2022-09-13

## 2022-09-29 RX ORDER — ATORVASTATIN CALCIUM 20 MG/1
20 TABLET, FILM COATED ORAL NIGHTLY
Qty: 90 TABLET | Refills: 3 | Status: SHIPPED | OUTPATIENT
Start: 2022-09-29

## 2022-09-29 RX ORDER — OMEPRAZOLE 20 MG/1
20 CAPSULE, DELAYED RELEASE ORAL DAILY
Qty: 90 CAPSULE | Refills: 3 | Status: SHIPPED | OUTPATIENT
Start: 2022-09-29

## 2022-09-29 RX ORDER — HYDROCODONE BITARTRATE AND ACETAMINOPHEN 10; 325 MG/1; MG/1
1 TABLET ORAL 2 TIMES DAILY
Qty: 60 TABLET | Refills: 0 | Status: SHIPPED | OUTPATIENT
Start: 2022-09-29 | End: 2022-10-29

## 2022-09-29 RX ORDER — HYDROCODONE BITARTRATE AND ACETAMINOPHEN 10; 325 MG/1; MG/1
1 TABLET ORAL 2 TIMES DAILY
COMMUNITY
End: 2022-09-29

## 2022-09-29 RX ORDER — LISINOPRIL 40 MG/1
40 TABLET ORAL DAILY
Qty: 90 TABLET | Refills: 3 | Status: SHIPPED | OUTPATIENT
Start: 2022-09-29

## 2022-09-29 RX ORDER — FLUOXETINE 10 MG/1
10 CAPSULE ORAL DAILY
Qty: 90 CAPSULE | Refills: 3 | Status: SHIPPED | OUTPATIENT
Start: 2022-09-29

## 2022-09-30 ENCOUNTER — TELEPHONE (OUTPATIENT)
Dept: INTERNAL MEDICINE CLINIC | Facility: CLINIC | Age: 57
End: 2022-09-30

## 2023-01-24 ENCOUNTER — OFFICE VISIT (OUTPATIENT)
Facility: CLINIC | Age: 58
End: 2023-01-24

## 2023-01-24 VITALS
WEIGHT: 200 LBS | HEIGHT: 65 IN | SYSTOLIC BLOOD PRESSURE: 138 MMHG | BODY MASS INDEX: 33.32 KG/M2 | HEART RATE: 87 BPM | DIASTOLIC BLOOD PRESSURE: 92 MMHG

## 2023-01-24 DIAGNOSIS — K64.9 HEMORRHOIDS, UNSPECIFIED HEMORRHOID TYPE: Primary | ICD-10-CM

## 2023-01-24 PROCEDURE — 99215 OFFICE O/P EST HI 40 MIN: CPT | Performed by: NURSE PRACTITIONER

## 2023-01-24 PROCEDURE — 3075F SYST BP GE 130 - 139MM HG: CPT | Performed by: NURSE PRACTITIONER

## 2023-01-24 PROCEDURE — 3008F BODY MASS INDEX DOCD: CPT | Performed by: NURSE PRACTITIONER

## 2023-01-24 PROCEDURE — 3080F DIAST BP >= 90 MM HG: CPT | Performed by: NURSE PRACTITIONER

## 2023-01-24 RX ORDER — HYDROCORTISONE 25 MG/G
1 CREAM TOPICAL 2 TIMES DAILY
Qty: 30 G | Refills: 0 | Status: SHIPPED | OUTPATIENT
Start: 2023-01-24 | End: 2023-02-07

## 2023-01-31 ENCOUNTER — OFFICE VISIT (OUTPATIENT)
Facility: CLINIC | Age: 58
End: 2023-01-31

## 2023-01-31 VITALS
RESPIRATION RATE: 14 BRPM | BODY MASS INDEX: 33.32 KG/M2 | WEIGHT: 200 LBS | HEIGHT: 65 IN | OXYGEN SATURATION: 98 % | HEART RATE: 80 BPM | SYSTOLIC BLOOD PRESSURE: 116 MMHG | DIASTOLIC BLOOD PRESSURE: 70 MMHG

## 2023-01-31 DIAGNOSIS — M47.26 OSTEOARTHRITIS OF SPINE WITH RADICULOPATHY, LUMBAR REGION: ICD-10-CM

## 2023-01-31 DIAGNOSIS — I10 BENIGN ESSENTIAL HYPERTENSION: Primary | ICD-10-CM

## 2023-01-31 DIAGNOSIS — E78.2 MIXED HYPERLIPIDEMIA: ICD-10-CM

## 2023-01-31 DIAGNOSIS — K43.9 VENTRAL HERNIA WITHOUT OBSTRUCTION OR GANGRENE: ICD-10-CM

## 2023-01-31 PROCEDURE — 3074F SYST BP LT 130 MM HG: CPT | Performed by: INTERNAL MEDICINE

## 2023-01-31 PROCEDURE — 3078F DIAST BP <80 MM HG: CPT | Performed by: INTERNAL MEDICINE

## 2023-01-31 PROCEDURE — 99214 OFFICE O/P EST MOD 30 MIN: CPT | Performed by: INTERNAL MEDICINE

## 2023-01-31 PROCEDURE — 3008F BODY MASS INDEX DOCD: CPT | Performed by: INTERNAL MEDICINE

## 2023-02-08 ENCOUNTER — OFFICE VISIT (OUTPATIENT)
Dept: SURGERY | Facility: CLINIC | Age: 58
End: 2023-02-08

## 2023-02-08 DIAGNOSIS — K43.9 VENTRAL HERNIA WITHOUT OBSTRUCTION OR GANGRENE: Primary | ICD-10-CM

## 2023-02-08 PROCEDURE — 99203 OFFICE O/P NEW LOW 30 MIN: CPT | Performed by: SURGERY

## 2023-02-08 NOTE — PATIENT INSTRUCTIONS
Obtain preoperative testing.     Plan laparoscopic repair of ventral hernia with mesh at [facility]    Stop aspirin and NSAIDs for 5 days prior to surgery

## 2023-02-24 RX ORDER — NAPROXEN 500 MG/1
500 TABLET ORAL 2 TIMES DAILY WITH MEALS
Qty: 60 TABLET | Refills: 0 | Status: SHIPPED | OUTPATIENT
Start: 2023-02-24 | End: 2023-03-26

## 2023-03-10 RX ORDER — GABAPENTIN 600 MG/1
600 TABLET ORAL 2 TIMES DAILY
Qty: 180 TABLET | Refills: 3 | Status: SHIPPED | OUTPATIENT
Start: 2023-03-10

## 2023-03-10 NOTE — TELEPHONE ENCOUNTER
Gabapentin was prescribed by neurology previously but patient is no longer seeing them. He would like to know if you would take over managing medication prescribed for   Arachnoiditis? Patient only has a few days left.

## 2023-03-16 ENCOUNTER — NURSE TRIAGE (OUTPATIENT)
Dept: INTERNAL MEDICINE CLINIC | Facility: CLINIC | Age: 58
End: 2023-03-16

## 2023-03-16 ENCOUNTER — OFFICE VISIT (OUTPATIENT)
Dept: INTERNAL MEDICINE CLINIC | Facility: CLINIC | Age: 58
End: 2023-03-16

## 2023-03-16 VITALS
SYSTOLIC BLOOD PRESSURE: 139 MMHG | HEART RATE: 56 BPM | OXYGEN SATURATION: 97 % | TEMPERATURE: 97 F | RESPIRATION RATE: 16 BRPM | BODY MASS INDEX: 32.99 KG/M2 | DIASTOLIC BLOOD PRESSURE: 90 MMHG | WEIGHT: 198 LBS | HEIGHT: 65 IN

## 2023-03-16 DIAGNOSIS — R06.09 DYSPNEA ON EXERTION: Primary | ICD-10-CM

## 2023-03-16 DIAGNOSIS — J06.9 URI WITH COUGH AND CONGESTION: ICD-10-CM

## 2023-03-16 PROCEDURE — 3080F DIAST BP >= 90 MM HG: CPT | Performed by: NURSE PRACTITIONER

## 2023-03-16 PROCEDURE — 99213 OFFICE O/P EST LOW 20 MIN: CPT | Performed by: NURSE PRACTITIONER

## 2023-03-16 PROCEDURE — 3008F BODY MASS INDEX DOCD: CPT | Performed by: NURSE PRACTITIONER

## 2023-03-16 PROCEDURE — 3075F SYST BP GE 130 - 139MM HG: CPT | Performed by: NURSE PRACTITIONER

## 2023-03-16 RX ORDER — METHYLPREDNISOLONE 4 MG/1
TABLET ORAL
Qty: 21 TABLET | Refills: 0 | Status: SHIPPED | OUTPATIENT
Start: 2023-03-16

## 2023-03-16 RX ORDER — ALBUTEROL SULFATE 90 UG/1
2 AEROSOL, METERED RESPIRATORY (INHALATION) EVERY 4 HOURS PRN
Qty: 18 G | Refills: 0 | Status: SHIPPED | OUTPATIENT
Start: 2023-03-16

## 2023-03-16 RX ORDER — AMOXICILLIN AND CLAVULANATE POTASSIUM 875; 125 MG/1; MG/1
1 TABLET, FILM COATED ORAL 2 TIMES DAILY
Qty: 20 TABLET | Refills: 0 | Status: SHIPPED | OUTPATIENT
Start: 2023-03-16 | End: 2023-03-26

## 2023-06-26 ENCOUNTER — OFFICE VISIT (OUTPATIENT)
Dept: INTERNAL MEDICINE CLINIC | Facility: CLINIC | Age: 58
End: 2023-06-26

## 2023-06-26 VITALS
WEIGHT: 192 LBS | BODY MASS INDEX: 31.99 KG/M2 | SYSTOLIC BLOOD PRESSURE: 130 MMHG | OXYGEN SATURATION: 96 % | RESPIRATION RATE: 14 BRPM | HEART RATE: 74 BPM | DIASTOLIC BLOOD PRESSURE: 80 MMHG | HEIGHT: 65 IN

## 2023-06-26 DIAGNOSIS — M47.26 OSTEOARTHRITIS OF SPINE WITH RADICULOPATHY, LUMBAR REGION: ICD-10-CM

## 2023-06-26 DIAGNOSIS — F41.9 ANXIETY: ICD-10-CM

## 2023-06-26 DIAGNOSIS — E78.2 MIXED HYPERLIPIDEMIA: ICD-10-CM

## 2023-06-26 DIAGNOSIS — I10 BENIGN ESSENTIAL HYPERTENSION: Primary | ICD-10-CM

## 2023-06-26 PROCEDURE — 3075F SYST BP GE 130 - 139MM HG: CPT | Performed by: INTERNAL MEDICINE

## 2023-06-26 PROCEDURE — 3008F BODY MASS INDEX DOCD: CPT | Performed by: INTERNAL MEDICINE

## 2023-06-26 PROCEDURE — 99214 OFFICE O/P EST MOD 30 MIN: CPT | Performed by: INTERNAL MEDICINE

## 2023-06-26 PROCEDURE — 3079F DIAST BP 80-89 MM HG: CPT | Performed by: INTERNAL MEDICINE

## 2023-10-01 RX ORDER — LISINOPRIL 40 MG/1
40 TABLET ORAL DAILY
Qty: 90 TABLET | Refills: 0 | Status: SHIPPED | OUTPATIENT
Start: 2023-10-01

## 2023-10-01 NOTE — TELEPHONE ENCOUNTER
Please review. Protocol failed / No Protocol. Requested Prescriptions   Pending Prescriptions Disp Refills    LISINOPRIL 40 MG Oral Tab [Pharmacy Med Name: Lisinopril 40 Mg Tab Lupi] 90 tablet 0     Sig: Take 1 tablet (40 mg total) by mouth daily. Hypertensive Medications Protocol Failed - 9/29/2023  5:35 PM        Failed - CMP or BMP in past 6 months     No results found for this or any previous visit (from the past 4392 hour(s)).             Failed - EGFRCR or GFRNAA > 50     GFR Evaluation            Passed - In person appointment in the past 12 or next 3 months     Recent Outpatient Visits              3 months ago Benign essential hypertension    [facility], [address] [place] [address], Elvin Garcia MD    Office Visit    6 months ago Dyspnea on exertion    Aydin Sanchez Kathi, APRNIYAH    Office Visit    7 months ago Ventral hernia without obstruction or gangrene    [facility], [address] , Lisandro Watkins MD    Office Visit    8 months ago Benign essential hypertension    [facility], [address] [facility], Elvin Garcia MD    Office Visit    8 months ago Hemorrhoids, unspecified hemorrhoid type    [facility], [address], [facility]., APRN    Office Visit                      Passed - Last BP reading less than 140/90     BP Readings from Last 1 Encounters:  06/26/23 : 130/80              Passed - In person appointment or virtual visit in the past 6 months     Recent Outpatient Visits              3 months ago Benign essential hypertension    [facility], Elvin Garcia MD    Office Visit    6 months ago Dyspnea on exertion    [facility], [address] [facility]    Office Visit    7 months ago Ventral hernia without obstruction or gangrene Kevin Chance MD    Office Visit    8 months ago Benign essential hypertension    Deepali French MD    Office Visit    8 months ago Hemorrhoids, unspecified hemorrhoid type    [facility], [address] [facility] [address], Immanuel Sol, STEPHANIE    Office Visit

## 2023-11-21 RX ORDER — OMEPRAZOLE 20 MG/1
20 CAPSULE, DELAYED RELEASE ORAL DAILY
Qty: 90 CAPSULE | Refills: 2 | Status: SHIPPED | OUTPATIENT
Start: 2023-11-21

## 2023-11-21 NOTE — TELEPHONE ENCOUNTER
Refill passed per [place] [facility] [place], [facility] protocol. Requested Prescriptions   Pending Prescriptions Disp Refills    OMEPRAZOLE 20 MG Oral Capsule Delayed Release [Pharmacy Med Name: Omeprazole Dr 20 Mg Cap Nort] 90 capsule 0     Sig: Take 1 capsule (20 mg total) by mouth daily.        Gastrointestional Medication Protocol Passed - 11/20/2023  8:37 AM        Passed - In person appointment or virtual visit in the past 12 mos or appointment in next 3 mos     Recent Outpatient Visits              4 months ago Benign essential hypertension    [facility], [address] [facility], Nori Becerril MD    Office Visit    8 months ago Dyspnea on exertion    [address] [facility]    Office Visit    9 months ago Ventral hernia without obstruction or gangrene    [facility], Sheryl Carrillo, Za Nguyễn MD    Office Visit    9 months ago Benign essential hypertension    [address], [address] [facility], Nori Becerril MD    Office Visit    10 months ago Hemorrhoids, unspecified hemorrhoid type    [facility], [address], [facility], APR    Office Visit                           Recent Outpatient Visits              4 months ago Benign essential hypertension    [address] [facility], Nori Becerril MD    Office Visit    8 months ago Dyspnea on exertion    [address] [facility], [facility], APR    Office Visit    9 months ago Ventral hernia without obstruction or gangrene    [address], [place]oberto[place]ris Carrillo, Za Nguyễn MD    Office Visit    9 months ago Benign essential hypertension    Brittany Herrera, Nori Becerril MD    Office Visit    10 months ago Hemorrhoids, unspecified hemorrhoid type    [facility], [address], [employer], Estle Bumpers, [employer]

## 2023-12-18 RX ORDER — LISINOPRIL 40 MG/1
40 TABLET ORAL DAILY
Qty: 90 TABLET | Refills: 0 | Status: SHIPPED | OUTPATIENT
Start: 2023-12-18

## 2023-12-18 NOTE — TELEPHONE ENCOUNTER
Please review; protocol failed. Requested Prescriptions   Pending Prescriptions Disp Refills    LISINOPRIL 40 MG Oral Tab [Pharmacy Med Name: Lisinopril 40 Mg Tab Lupi] 90 tablet 0     Sig: Take 1 tablet (40 mg total) by mouth daily. Hypertensive Medications Protocol Failed - 12/17/2023  1:30 AM        Failed - CMP or BMP in past 6 months     No results found for this or any previous visit (from the past 4392 hour(s)).             Failed - EGFRCR or GFRNAA > 50     GFR Evaluation            Passed - In person appointment in the past 12 or next 3 months     Recent Outpatient Visits              5 months ago Benign essential hypertension    [address] [facility], Chaya Cummins MD    Office Visit    9 months ago Dyspnea on exertion    [address] [facility], [facility], [facility]    Office Visit    10 months ago Ventral hernia without obstruction or gangrene    [facility], [address], Nasir Mora MD    Office Visit    10 months ago Benign essential hypertension    [address] [facility], [address] [facility], Chaya Cummins MD    Office Visit    10 months ago Hemorrhoids, unspecified hemorrhoid type    [facility], [address], [facility] [address] APRN    Office Visit                      Passed - Last BP reading less than 140/90     BP Readings from Last 1 Encounters:   06/26/23 130/80               Passed - In person appointment or virtual visit in the past 6 months     Recent Outpatient Visits              5 months ago Benign essential hypertension    [address] [facility], Chaya Cummins MD    Office Visit    9 months ago Dyspnea on exertion    [facility], [address] [facility], [facility]    Office Visit    10 months ago Ventral hernia without obstruction or gangrene    [facility], Riddhi Rey MD    Office Visit    10 months ago Benign essential hypertension    [facility], [address] [facility], Issac Chan MD    Office Visit    10 months ago Hemorrhoids, unspecified hemorrhoid type    [facility], [address], [place] [facility], APRN    Office Visit                           Recent Outpatient Visits              5 months ago Benign essential hypertension    [address] [facility], Issac Chan MD    Office Visit    9 months ago Dyspnea on exertion    [address] [facility], [facility], [facility]    Office Visit    10 months ago Ventral hernia without obstruction or gangrene    [facility], [facility], Zaid Tavarez MD    Office Visit    10 months ago Benign essential hypertension    [facility] Stacy, Issac Chan MD    Office Visit    10 months ago Hemorrhoids, unspecified hemorrhoid type    [facility], [address], [place] Guadalupe Cardona [employer]

## 2024-01-15 RX ORDER — FLUOXETINE 10 MG/1
10 CAPSULE ORAL DAILY
Qty: 90 CAPSULE | Refills: 0 | Status: SHIPPED | OUTPATIENT
Start: 2024-01-15

## 2024-01-15 RX ORDER — ATORVASTATIN CALCIUM 20 MG/1
20 TABLET, FILM COATED ORAL NIGHTLY
Qty: 90 TABLET | Refills: 3 | Status: SHIPPED | OUTPATIENT
Start: 2024-01-15

## 2024-01-15 NOTE — TELEPHONE ENCOUNTER
Please review; protocol failed for appointment only   Requested Prescriptions   Pending Prescriptions Disp Refills    FLUOXETINE 10 MG Oral Cap [Pharmacy Med Name: Fluoxetine Hydrochloride 10 Mg Cap Nort] 90 capsule 0     Sig: Take 1 capsule (10 mg total) by mouth daily.       Psychiatric Non-Scheduled (Anti-Anxiety) Failed - 1/13/2024  1:30 AM        Failed - In person appointment or virtual visit in the past 6 mos or appointment in next 3 mos     Recent Outpatient Visits              6 months ago Benign essential hypertension    [facility], [facility], [place] Elizabeth MD    Office Visit    10 months ago Dyspnea on exertion    [facility], [place] Marci Berry APRN    Office Visit    11 months ago Ventral hernia without obstruction or gangrene    [facility] Jah Harden MD    Office Visit    11 months ago Benign essential hypertension    [facility], Jun Fatima MD    Office Visit    11 months ago Hemorrhoids, unspecified hemorrhoid type    [facility]    [facility] Visit                        ATORVASTATIN 20 MG Oral Tab [Pharmacy Med Name: Atorvastatin Calcium 20 Mg Tab Nort] 90 tablet 0     Sig: Take 1 tablet (20 mg total) by mouth nightly.       Cholesterol Medication Protocol Failed - 1/13/2024  1:30 AM        Failed - ALT in past 12 months        Failed - LDL in past 12 months        Failed - Last ALT < 80     Lab Results   Component Value Date    ALT 15 08/01/2019             Failed - Last LDL < 130     Lab Results   Component Value Date    LDL 79 08/01/2019             Passed - In person appointment or virtual visit in the past 12 mos or appointment in next 3 mos     Recent Outpatient Visits              6 months ago Benign essential hypertension    [facility]  [facility], [facility], Jun Fatima MD    Office Visit    10 months ago Dyspnea on exertion    [facility], [facility], Marci Escobedo APRN    Office Visit    11 months ago Ventral hernia without obstruction or gangrene    [facility], [facility] Jah Harden MD    Office Visit    11 months ago Benign essential hypertension    [facility], [facility], Jun Fatima MD    Office Visit    11 months ago Hemorrhoids, unspecified hemorrhoid type    [facility], [facility], Irma Mcneal APRN    Office Visit                           Recent Outpatient Visits              6 months ago Benign essential hypertension    [facility], [facility], Jun Fatima MD    Office Visit    10 months ago Dyspnea on exertion    [facility], [facility], Marci Escobedo APRN    Office Visit    11 months ago Ventral hernia without obstruction or gangrene    [facility], [facility] Jah Harden MD    Office Visit    11 months ago Benign essential hypertension    [facility], [facility], Jun Fatima MD    Office Visit    11 months ago Hemorrhoids, unspecified hemorrhoid type    [facility], [facility], Irma Mcneal APRN    Office Visit

## 2024-01-15 NOTE — TELEPHONE ENCOUNTER
Please review; protocol failed/No Protocol  Pt did not complete labs from 09/2022 new labs need to be pended.   Requested Prescriptions   Pending Prescriptions Disp Refills    atorvastatin 20 MG Oral Tab [Pharmacy Med Name: Atorvastatin Calcium 20 Mg Tab Nort] 90 tablet 0     Sig: Take 1 tablet (20 mg total) by mouth nightly.       Cholesterol Medication Protocol Failed - 1/13/2024  1:30 AM        Failed - ALT in past 12 months        Failed - LDL in past 12 months        Failed - Last ALT < 80     Lab Results   Component Value Date    ALT 15 08/01/2019             Failed - Last LDL < 130     Lab Results   Component Value Date    LDL 79 08/01/2019             Passed - In person appointment or virtual visit in the past 12 mos or appointment in next 3 mos     Recent Outpatient Visits              6 months ago Benign essential hypertension    [facility], IsomJun Elizabeth MD    Office Visit    10 months ago Dyspnea on exertion    [facility], IsomMarci Kinney APRN    Office Visit    11 months ago Ventral hernia without obstruction or gangrene    [facility] Jah Brar MD    Office Visit    11 months ago Benign essential hypertension    [facility], Jun Fatima MD    Office Visit    11 months ago Hemorrhoids, unspecified hemorrhoid type    [facility], IsomIrma Espinosa APRN    Office Visit                       Signed Prescriptions Disp Refills    FLUoxetine 10 MG Oral Cap 90 capsule 0     Sig: Take 1 capsule (10 mg total) by mouth daily.       Psychiatric Non-Scheduled (Anti-Anxiety) Failed - 1/13/2024  1:30 AM        Failed - In person appointment or virtual visit in the past 6 mos or appointment in next 3 mos     Recent Outpatient Visits              6 months ago Benign essential hypertension     [facility], [facility], Jun Fatima MD    Office Visit    10 months ago Dyspnea on exertion    [facility], [facility], Marci Escobedo APRN    Office Visit    11 months ago Ventral hernia without obstruction or gangrene    [facility], [facility] Jah Harden MD    Office Visit    11 months ago Benign essential hypertension    [facility], [facility], Jun Fatima MD    Office Visit    11 months ago Hemorrhoids, unspecified hemorrhoid type    [facility], [facility], Irma Mcneal APRN    Office Visit                           Recent Outpatient Visits              6 months ago Benign essential hypertension    [facility], [facility], Jun Fatima MD    Office Visit    10 months ago Dyspnea on exertion    [facility], [facility], Marci Escobedo APRN    Office Visit    11 months ago Ventral hernia without obstruction or gangrene    [facility], [facility] Jah Harden MD    Office Visit    11 months ago Benign essential hypertension    [facility], [facility], Jun Fatima MD    Office Visit    11 months ago Hemorrhoids, unspecified hemorrhoid type    [facility], [facility], Irma Mcneal APRN    Office Visit

## 2024-02-09 RX ORDER — GABAPENTIN 600 MG/1
600 TABLET ORAL 2 TIMES DAILY
Qty: 180 TABLET | Refills: 3 | Status: SHIPPED | OUTPATIENT
Start: 2024-02-09

## 2024-02-09 NOTE — TELEPHONE ENCOUNTER
Refill passed per [facility] protocol.    Requested Prescriptions   Pending Prescriptions Disp Refills    GABAPENTIN 600 MG Oral Tab [Pharmacy Med Name: Gabapentin 600 Mg Tab Nort] 180 tablet 0     Sig: Take 1 tablet (600 mg total) by mouth 2 (two) times daily.       Neurology Medications Passed - 2/9/2024  1:30 AM        Passed - In person appointment or virtual visit in the past 6 mos or appointment in next 3 mos     Recent Outpatient Visits              7 months ago [facility] essential [facility]    [facility]    11 months ago Dyspnea on exertion    [facility]    Office Visit    1 year ago Ventral hernia without obstruction or gangrene    [facility] Jah Harden MD    Office Visit    1 year ago Benign essential hypertension    [facility] Jun Garay MD    Office Visit    1 year ago Hemorrhoids, unspecified hemorrhoid type    [facility] Irma Panchal APRN    Office Visit          Future Appointments         Provider Department Appt Notes    In 1 month Jun Garay MD [facility] Annual , Perscription update, discuss cass                  Recent Outpatient Visits              7 months ago Benign essential hypertension    [facility] Jun Fatima MD    Office Visit    11 months ago Dyspnea on exertion    [facility]    Office Visit    1 year ago Ventral hernia without obstruction or gangrene    [facility] Jah Harden MD    Office Visit    1 year ago Benign essential hypertension    [facility] [place] Jun Garay MD    Office Visit    1 year ago Hemorrhoids, unspecified hemorrhoid type    [facility], [facility], [place] Irma Panchal APRN    Office Visit          Future Appointments         Provider Department Appt Notes    In 1 month Jun Garay MD [facility], [facility] [place] Annual , Perscription update, discuss cass

## 2024-03-09 RX ORDER — LISINOPRIL 40 MG/1
40 TABLET ORAL DAILY
Qty: 90 TABLET | Refills: 0 | Status: SHIPPED | OUTPATIENT
Start: 2024-03-09

## 2024-03-12 ENCOUNTER — MED REC SCAN ONLY (OUTPATIENT)
Dept: INTERNAL MEDICINE CLINIC | Facility: CLINIC | Age: 59
End: 2024-03-12

## 2024-04-02 ENCOUNTER — OFFICE VISIT (OUTPATIENT)
Facility: CLINIC | Age: 59
End: 2024-04-02

## 2024-04-02 VITALS
OXYGEN SATURATION: 98 % | WEIGHT: 197 LBS | HEART RATE: 82 BPM | DIASTOLIC BLOOD PRESSURE: 84 MMHG | SYSTOLIC BLOOD PRESSURE: 126 MMHG | HEIGHT: 65 IN | BODY MASS INDEX: 32.82 KG/M2

## 2024-04-02 DIAGNOSIS — Z00.00 ADULT GENERAL MEDICAL EXAM: Primary | ICD-10-CM

## 2024-04-02 PROCEDURE — 99396 PREV VISIT EST AGE 40-64: CPT | Performed by: INTERNAL MEDICINE

## 2024-04-02 RX ORDER — FLUOXETINE 10 MG/1
10 CAPSULE ORAL DAILY
Qty: 90 CAPSULE | Refills: 0 | Status: SHIPPED | OUTPATIENT
Start: 2024-04-02

## 2024-04-02 RX ORDER — LISINOPRIL 40 MG/1
40 TABLET ORAL DAILY
Qty: 90 TABLET | Refills: 0 | Status: SHIPPED | OUTPATIENT
Start: 2024-04-02

## 2024-04-02 RX ORDER — ATORVASTATIN CALCIUM 20 MG/1
20 TABLET, FILM COATED ORAL NIGHTLY
Qty: 90 TABLET | Refills: 3 | Status: SHIPPED | OUTPATIENT
Start: 2024-04-02

## 2024-04-02 RX ORDER — OMEPRAZOLE 20 MG/1
20 CAPSULE, DELAYED RELEASE ORAL DAILY
Qty: 90 CAPSULE | Refills: 2 | Status: SHIPPED | OUTPATIENT
Start: 2024-04-02

## 2024-04-02 RX ORDER — GABAPENTIN 600 MG/1
600 TABLET ORAL 2 TIMES DAILY
Qty: 180 TABLET | Refills: 3 | Status: SHIPPED | OUTPATIENT
Start: 2024-04-02

## 2024-04-02 RX ORDER — NAPROXEN 500 MG/1
1 TABLET ORAL
COMMUNITY

## 2024-04-02 NOTE — PROGRESS NOTES
Mukul Mendes is a 58 year old male.  Chief Complaint   Patient presents with    Physical     Pt here for annual physical     HPI:   58 year old male here for physical. He reports that he is doing well. Continues to have back discomfort. He was seen in [place], been in [facility] for back pain  he is taking gabapentin for pain control he completed his blood test as part of physical in November. I don't have the results for bloodwork. He wandy lget back to me through my chart. He is requesting for calcium score.        Past medical history   hypertension, dyslipidemia, lumbar spine disease     SHX knee surgery for ACL repair, tonsillectomy and septoplasty., laminectomy in [place]     He is not allergic any medication.     He does not smoke, denies alcohol or recreational drug abuse.     Family history-his father had gastric cancer (not colon cancer).  He denies any prostate cancer.  His brother had heart disease at the age of 40.  His dad has heart disease at the age of 60.  He denies any venous thromboembolism.     Copied from [place] note     55 yo R handed . Previously very active with water and snow skiing, bike riding, roller blading. Had a fall at his [employer] in June '21, landed on back when shoreline gave way. Had previously had c/o intermittent back stiffness, aching and spasms. Back pain worsened after fall. [facility] for 39 sessions, used heat, did inversion table and PT. Pt ultimately had surgery at [place] with Dr. Navas in Jan '22:  \"Microscopic L3, L4 and L5 lumbar laminectomy with bilateral partial facetectomy and foraminotomy\".  Postoperativelly, pt c/o lightheadedness, dizziness, headache that worsened with standing for several months. He now endorses painful stabbing sensations in the central low back/sacral region and the R buttock with occasional radiation to the R posterior thigh. He also endorses severe burning pain in the anus that radiates to the R testicle; states it feels as  if someone is trying to rip it off. He denies bladder dysfunction but reports erectile dysfunction with loss of sensation. Bowel movements are painful when he bears down. Also with increased pressure in low back prior to BM.   If he reaches arms out or looks up, he has a pulling sensation in the low back. He can walk about one block. He has difficulty with prolonged sitting. He denies weakness in the legs.   Pt had postoperative PT and first LEONELA on 8/3/21. States it improved pain x 1 week but at 2nd week, pain was worse than before. He is taking Tramadol PRN and 1200 mg Neurontin per day      Copied  from care everywhere  Dr. De La Torre offered L3-5 laminectomies for residual stenosis. If leg pain persists postoperatively, may be candidate for DCS.  Pt wishes to consider his options and will call office if he wishes to proceed      Current Outpatient Medications   Medication Sig Dispense Refill    naproxen 500 MG Oral Tab Take 1 tablet (500 mg total) by mouth 2 (two) times daily before meals.      lisinopril 40 MG Oral Tab Take 1 tablet (40 mg total) by mouth daily. 90 tablet 0    gabapentin 600 MG Oral Tab Take 1 tablet (600 mg total) by mouth 2 (two) times daily. 180 tablet 3    FLUoxetine 10 MG Oral Cap Take 1 capsule (10 mg total) by mouth daily. 90 capsule 0    atorvastatin 20 MG Oral Tab Take 1 tablet (20 mg total) by mouth nightly. 90 tablet 3    omeprazole 20 MG Oral Capsule Delayed Release Take 1 capsule (20 mg total) by mouth daily. 90 capsule 2    albuterol 108 (90 Base) MCG/ACT Inhalation Aero Soln Inhale 2 puffs into the lungs every 4 (four) hours as needed for Wheezing or Shortness of Breath. 18 g 0    ASPIRIN ADULT LOW STRENGTH 81 MG Oral Tab EC Take 1 tablet (81 mg total) by mouth daily.  90 tablet 0      Past Medical History:   Diagnosis Date    Abnormal fasting glucose 12/1/2017    Colon polyps 2021    repeat CLN in 5-7 years    Essential hypertension     High blood pressure     High cholesterol      Hypercholesterolemia 2016    Hyperlipidemia       Past Surgical History:   Procedure Laterality Date    ANTERIOR CRUCIATE LIGAMENT REPAIR Right     BACK SURGERY      l3-4-5    COLONOSCOPY N/A 2021    Procedure: COLONOSCOPY;  Surgeon: SHAMAR Ramírez MD;  Location: [facility] ENDOSCOPY    OTHER SURGICAL HISTORY      deviated septum    OTHER SURGICAL HISTORY Left     knee arthroscopy - meniscal tear    TONSILLECTOMY        Social History:  Social History     Socioeconomic History    Marital status:     Number of children: 2   Tobacco Use    Smoking status: Former     Packs/day: 0.75     Years: 14.00     Additional pack years: 0.00     Total pack years: 10.50     Types: Cigarettes     Quit date: 2003     Years since quittin.2    Smokeless tobacco: Never   Substance and Sexual Activity    Alcohol use: Yes     Alcohol/week: 5.0 standard drinks of alcohol     Types: 5 Glasses of wine per week     Comment: 5 drinks/week    Drug use: No      Family History   Problem Relation Age of Onset    Cancer Father         Stomach CA    Hypertension Father     Heart Disorder Father         MI    Other (Other) Maternal Grandmother         Alzheimers    Other (Other) Paternal Grandmother         Alzheimers    Heart Disorder Brother         MI      No Known Allergies     REVIEW OF SYSTEMS:   Review of Systems   Review of Systems   Constitutional: Negative for activity change, appetite change and fever.   HENT: Negative for congestion and voice change.    Respiratory: Negative for cough and shortness of breath.    Cardiovascular: Negative for chest pain.   Gastrointestinal: Negative for abdominal distention, abdominal pain and vomiting.   Genitourinary: Negative for hematuria.   Skin: Negative for wound.   Psychiatric/Behavioral: Negative for behavioral problems.   Wt Readings from Last 5 Encounters:   24 197 lb (89.4 kg)   23 192 lb (87.1 kg)   23 198 lb (89.8 kg)   23 200 lb (90.7 kg)    01/24/23 200 lb (90.7 kg)     Body mass index is 32.78 kg/m².      EXAM:   /84   Pulse 82   Ht 5' 5\" (1.651 m)   Wt 197 lb (89.4 kg)   SpO2 98%   BMI 32.78 kg/m²   Physical Exam   Constitutional:       Appearance: Normal appearance.   HENT:      Head: Normocephalic.   Eyes:      Conjunctiva/sclera: Conjunctivae normal.   Cardiovascular:      Rate and Rhythm: Normal rate and regular rhythm.      Heart sounds: Normal heart sounds. No murmur heard.  Pulmonary:      Effort: Pulmonary effort is normal.      Breath sounds: Normal breath sounds. No rhonchi or rales.   Abdominal:      General: Bowel sounds are normal.      Palpations: Abdomen is soft.      Tenderness: There is no abdominal tenderness.   Musculoskeletal:      Cervical back: Neck supple.      Right lower leg: No edema.      Left lower leg: No edema.   Skin:     General: Skin is warm and dry.   Neurological:      General: No focal deficit present.      Mental Status: He is alert and oriented to person, place, and time. Mental status is at baseline.   Psychiatric:         Mood and Affect: Mood normal.         Behavior: Behavior normal.       ASSESSMENT AND PLAN:   1. Adult general medical exam  Encourage patient to eat healthy with fruits and vegetables.  Avoid too much of sweets and carbohydrates.  Screenings: Up-to-date on colonoscopy.  He completed his blood test through his work.  I do not have the report.  I have informed him to make sure his PSA has been checked  Vaccines: Reviewed and discussed  Labs: Completed through work.  I do not have the report.    Patient denies any abuse or depression.  Patient reports adherence with all the medication.    - CT CALCIUM SCORING; Future    Hypertension controlled    Dyslipidemia-continue statins    Lumbar spine disease-stable now.  Was seen in [place] and [place] in the past.    Plan: As above.      The patient indicates understanding of these issues and agrees to the plan.  No follow-ups on file.    This  note was prepared using Dragon Medical voice recognition dictation software. As a result errors may occur. When identified these errors have been corrected. While every attempt is made to correct errors during dictation discrepancies may still exist.

## 2024-05-16 ENCOUNTER — OFFICE VISIT (OUTPATIENT)
Dept: SURGERY | Facility: CLINIC | Age: 59
End: 2024-05-16

## 2024-05-16 VITALS
WEIGHT: 198 LBS | HEIGHT: 65 IN | HEART RATE: 64 BPM | DIASTOLIC BLOOD PRESSURE: 86 MMHG | RESPIRATION RATE: 16 BRPM | BODY MASS INDEX: 32.99 KG/M2 | SYSTOLIC BLOOD PRESSURE: 128 MMHG

## 2024-05-16 DIAGNOSIS — N52.8 OTHER MALE ERECTILE DYSFUNCTION: Primary | ICD-10-CM

## 2024-05-16 PROCEDURE — 99203 OFFICE O/P NEW LOW 30 MIN: CPT | Performed by: UROLOGY

## 2024-05-16 NOTE — PROGRESS NOTES
SUBJECTIVE:  Mukul Mendes is a 58 year old male who presents for a consultation at the request of, and a copy of this note will be sent to, Dr Graay, for evaluation of  erectile dysfunction. He states that the problem is unchanged. Symptoms include 2 to 3-year history of progressive erectile dysfunction.  There is a component of the erectile dysfunction related to back discomfort that he experiences that he experiences when he moves his back.  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  He has fused peripheral nerves distally which causes discomfort with sciatica-like pain when he moves his hips and low back.  He has been tried on tadalafil daily low-dose 5 mg and states it did not really help his erections.  He wakes up in the morning with weak erection sometimes.  Has a previous medical history of hypertension and hypercholesterolemia.. He denies any other complaints.    Allergies: No Known Allergies    History:  Past Medical History:    Abnormal fasting glucose    Colon polyps    repeat CLN in 5-7 years    Essential hypertension    High blood pressure    High cholesterol    Hypercholesterolemia    Hyperlipidemia      Past Surgical History:   Procedure Laterality Date    Anterior cruciate ligament repair Right     Back surgery  2022    l3-4-5    Colonoscopy N/A 12/08/2021    Procedure: COLONOSCOPY;  Surgeon: SHAMAR Ramírez MD;  Location: [facility] ENDOSCOPY    Other surgical history      deviated septum    Other surgical history Left     knee arthroscopy - meniscal tear    Tonsillectomy        Family History   Problem Relation Age of Onset    Cancer Father         Stomach CA    Hypertension Father     Heart Disorder Father         MI    Other (Other) Maternal Grandmother         Alzheimers    Other (Other) Paternal Grandmother         Alzheimers    Heart Disorder Brother         MI      Social History:   Social History     Socioeconomic History     Marital status:     Number of children: 2   Tobacco Use    Smoking status: Former     Current packs/day: 0.00     Average packs/day: 0.8 packs/day for 14.0 years (10.5 ttl pk-yrs)     Types: Cigarettes     Start date: 1989     Quit date: 2003     Years since quittin.3    Smokeless tobacco: Never   Substance and Sexual Activity    Alcohol use: Yes     Alcohol/week: 5.0 standard drinks of alcohol     Types: 5 Glasses of wine per week     Comment: 5 drinks/week    Drug use: No     Social Determinants of Health      [facility], [facility]    Housing Stability            REVIEW OF SYSTEMS:  RESPIRATORY:  Negative for chest tightness, wheezing, cough, shortness of breath,  sputum production,chest pain or pleurisy.  CARDIOVASCULAR:  Negative for pain or chest discomfort, dizziness or fainting, palpitations, leg swelling, nocturia, or claudication.  GASTROINTESTINAL:  Negative for nausea, vomiting, diarrhea, constipation, heartburn or indigestion, abdominal pains, bloody or tarry stools.  GENERAL: Denies:  weight gain, weight loss, fever, night sweats, bone pain, malaise, and fatigue. Positive for:  none.  All other review of systems reviewed and are otherwise negative    OBJECTIVE:  /86 (BP Location: Left arm, Patient Position: Sitting, Cuff Size: large)   Pulse 64   Resp 16   Ht 5' 5\" (1.651 m)   Wt 198 lb (89.8 kg)   BMI 32.95 kg/m²   He appears well, in no apparent distress.  Alert and oriented times three, pleasant and cooperative.  CARDIOVASCULAR:normal apical impulse  RESPIRATORY: no chest wall deformities or tenderness  ABDOMEN: abdomen is soft without significant tenderness, masses, organomegaly or guarding  Skin exam grossly normal  Intact neurologically grossly  ASSESSMENT/PLAN  Encounter Diagnosis   Name Primary?    Other male erectile dysfunction Yes   Recommend:  - Sildenafil 100 mg as needed.  Side effects reviewed.  -  Follow-up in 6 weeks to review results.    No orders of the defined types were placed in this encounter.      Meds This Visit:  Requested Prescriptions      No prescriptions requested or ordered in this encounter       Imaging & Referrals:  None

## 2024-05-20 ENCOUNTER — TELEPHONE (OUTPATIENT)
Dept: SURGERY | Facility: CLINIC | Age: 59
End: 2024-05-20

## 2024-05-20 NOTE — TELEPHONE ENCOUNTER
[facility] Please update patient through mychart, thanks.

## 2024-05-21 RX ORDER — SILDENAFIL 100 MG/1
100 TABLET, FILM COATED ORAL
Qty: 10 TABLET | Refills: 0 | Status: SHIPPED | OUTPATIENT
Start: 2024-05-21

## 2024-05-21 NOTE — TELEPHONE ENCOUNTER
I sent pt a my chart that I sent the sildenafil 100 mg to his [facility] pharmacy.       LOV 5/16/24  ASSESSMENT/PLAN       Encounter Diagnosis   Name Primary?    Other male erectile dysfunction Yes   Recommend:  - Sildenafil 100 mg as needed.  Side effects reviewed.  - Follow-up in 6 weeks to review results.     No orders of the defined types were placed in this encounter.        Meds This Visit:  Requested Prescriptions        No prescriptions requested or ordered in this encounter

## 2024-05-23 NOTE — TELEPHONE ENCOUNTER
Please review. Last written 2/2023 by Dr. Jun Garay     Is now listed as patient reported.   refill?     Requested Prescriptions   Pending Prescriptions Disp Refills    naproxen 500 MG Oral Tab  0     Sig: Take 1 tablet (500 mg total) by mouth 2 (two) times daily before meals.       Non-Narcotic Pain Medication Protocol Passed - 5/20/2024  4:27 PM        Passed - In person appointment or virtual visit in the past 6 mos or appointment in next 3 mos     Recent Outpatient Visits              1 week ago Other male erectile dysfunction    [facility] Freddy Saunders MD    Office Visit    1 month ago [facility] exam    [facility]    Office Visit    11 months ago Benign essential hypertension    [facility], Jun Fatima MD    Office Visit    1 year ago Dyspnea on exertion    [facility] Marci Berry APRN    Office Visit    1 year ago Ventral hernia without obstruction or gangrene    [facility] Jah Harden MD    Office Visit          Future Appointments         Provider Department Appt Notes    In 4 months Jun Garay MD [facility] 6 month f/ u                       Future Appointments         Provider Department Appt Notes    In 4 months Jun Garay MD [facility] 6 month f/ u          Recent Outpatient Visits              1 week ago Other male erectile dysfunction    [facility] Freddy Saunders MD    Office Visit    1 month ago [facility] exam    [facility]    Office Visit    11 months ago Benign essential hypertension    [facility]  [facility] [place] [place] [facility]    Office Visit    1 year ago Dyspnea on exertion    [facility]    Office Visit    1 year ago Ventral hernia without obstruction or gangrene    [facility] Jah Harden MD    Office Visit

## 2024-05-24 RX ORDER — NAPROXEN 500 MG/1
500 TABLET ORAL
Qty: 30 TABLET | Refills: 1 | Status: SHIPPED | OUTPATIENT
Start: 2024-05-24

## 2024-08-05 RX ORDER — FLUOXETINE 10 MG/1
10 CAPSULE ORAL DAILY
Qty: 90 CAPSULE | Refills: 3 | Status: SHIPPED | OUTPATIENT
Start: 2024-08-05

## 2024-08-05 NOTE — TELEPHONE ENCOUNTER
Refill passed per [facility] protocol.    Please review pended refill request as unable to refill due to high/very high interaction warning copied here:        High  Drug-Drug: FLUoxetine and naproxenToxic effects may be increased with concurrent administration of NSAIDs and Selective Serotonin Reuptake Inhibitors. The risk of upper gastrointestinal bleeding may be increased. Patients taking both drugs concurrently should be educated about the signs and symptoms of GI bleeding.  Details  Requested Prescriptions   Pending Prescriptions Disp Refills    FLUOXETINE 10 MG Oral Cap [Pharmacy Med Name: Fluoxetine Hydrochloride 10 Mg Cap Nort] 90 capsule 0     Sig: Take 1 capsule (10 mg total) by mouth daily.       Psychiatric Non-Scheduled (Anti-Anxiety) Passed - 7/31/2024  9:54 AM        Passed - In person appointment or virtual visit in the past 6 mos or appointment in next 3 mos     Recent Outpatient Visits              2 months ago Other male erectile dysfunction    [facility] Freddy Scott MD    Office Visit    4 months ago Adult general medical exam    [facility] Jun Garay MD    Office Visit    1 year ago Benign essential hypertension    [facility] Jun Garay MD    Office Visit    1 year ago Dyspnea on exertion    [facility] Marci Berry APRN    Office Visit    1 year ago Ventral hernia without obstruction or gangrene    [facility] Jah Harden MD    Office Visit          Future Appointments         Provider Department Appt Notes    In 2 months Jun Garay MD [facility] 6 month f/ u                    Passed - Depression Screening completed within the past 12 months           Recent Outpatient Visits              2  months ago Other male erectile dysfunction    [facility] Freddy Scott MD    Office Visit    4 months ago Adult general medical exam    [facility] Jun Garay MD    Office Visit    1 year ago Benign essential hypertension    [facility] Jun Garay MD    Office Visit    1 year ago Dyspnea on exertion    [facility] Marci Berry APRN    Office Visit    1 year ago Ventral hernia without obstruction or gangrene    [facility] Jah Harden MD    Office Visit          Future Appointments         Provider Department Appt Notes    In 2 months Jun Garay MD [facility] 6 month f/ u

## 2024-08-28 NOTE — TELEPHONE ENCOUNTER
Continue home levothyroxine     Please review; protocol failed.      Requested Prescriptions   Pending Prescriptions Disp Refills    LISINOPRIL 40 MG Oral Tab [Pharmacy Med Name: Lisinopril 40 Mg Tab Lupi] 90 tablet 0     Sig: Take 1 tablet (40 mg total) by mouth daily.       Hypertension Medications Protocol Failed - 3/8/2024  1:30 AM        Failed - CMP or BMP in past 12 months        Failed - EGFRCR or GFRNAA > 50     GFR Evaluation            Passed - Last BP reading less than 140/90     BP Readings from Last 1 Encounters:   06/26/23 130/80               Passed - In person appointment or virtual visit in the past 12 mos or appointment in next 3 mos     Recent Outpatient Visits              8 months ago Benign essential hypertension    [facility] Jun Garay MD    Office Visit    11 months ago Dyspnea on exertion    [facility] Marci Berry APRN    Office Visit    1 year ago Ventral hernia without obstruction or gangrene    [facility] Jah Harden MD    Office Visit    1 year ago Benign essential hypertension    [facility]    Office Visit    1 year ago Hemorrhoids, unspecified hemorrhoid type    [facility] Irma Panchal APRN    Office Visit          Future Appointments         Provider Department Appt Notes    In 3 weeks Jun Garay MD [facility] Annual , Perscription update, discuss cass

## 2024-10-08 ENCOUNTER — OFFICE VISIT (OUTPATIENT)
Facility: CLINIC | Age: 59
End: 2024-10-08

## 2024-10-08 VITALS
WEIGHT: 196 LBS | BODY MASS INDEX: 32.65 KG/M2 | DIASTOLIC BLOOD PRESSURE: 84 MMHG | HEART RATE: 75 BPM | OXYGEN SATURATION: 99 % | HEIGHT: 65 IN | SYSTOLIC BLOOD PRESSURE: 128 MMHG

## 2024-10-08 DIAGNOSIS — F41.9 ANXIETY: ICD-10-CM

## 2024-10-08 DIAGNOSIS — I10 BENIGN ESSENTIAL HYPERTENSION: Primary | ICD-10-CM

## 2024-10-08 DIAGNOSIS — M47.816 OSTEOARTHRITIS OF LUMBAR SPINE, UNSPECIFIED SPINAL OSTEOARTHRITIS COMPLICATION STATUS: ICD-10-CM

## 2024-10-08 DIAGNOSIS — E78.2 MIXED HYPERLIPIDEMIA: ICD-10-CM

## 2024-10-08 PROCEDURE — 99214 OFFICE O/P EST MOD 30 MIN: CPT | Performed by: INTERNAL MEDICINE

## 2024-10-08 RX ORDER — LISINOPRIL 40 MG/1
40 TABLET ORAL DAILY
Qty: 90 TABLET | Refills: 3 | Status: SHIPPED | OUTPATIENT
Start: 2024-10-08

## 2024-10-08 RX ORDER — FLUOXETINE 10 MG/1
10 CAPSULE ORAL DAILY
Qty: 90 CAPSULE | Refills: 3 | Status: SHIPPED | OUTPATIENT
Start: 2024-10-08

## 2024-10-08 RX ORDER — ATORVASTATIN CALCIUM 20 MG/1
20 TABLET, FILM COATED ORAL NIGHTLY
Qty: 90 TABLET | Refills: 3 | Status: SHIPPED | OUTPATIENT
Start: 2024-10-08

## 2024-10-08 NOTE — PROGRESS NOTES
Mukul Mendes is a 59 year old male.  Chief Complaint   Patient presents with    Follow - Up     6 month follow up     HPI:   59-year-old gentleman here for follow-up.  He reports that he is doing well except having discomfort in his spine.  Denies any chest pain or shortness of breath.  He is requesting for a neurosurgery referral here for a second opinion.      Current Outpatient Medications   Medication Sig Dispense Refill    atorvastatin 20 MG Oral Tab Take 1 tablet (20 mg total) by mouth nightly. 90 tablet 3    FLUoxetine 10 MG Oral Cap Take 1 capsule (10 mg total) by mouth daily. 90 capsule 3    lisinopril 40 MG Oral Tab Take 1 tablet (40 mg total) by mouth daily. 90 tablet 3    omeprazole 20 MG Oral Capsule Delayed Release Take 1 capsule (20 mg total) by mouth every morning. 90 capsule 2    naproxen 500 MG Oral Tab Take 1 tablet (500 mg total) by mouth 2 (two) times daily before meals. 30 tablet 1    Sildenafil Citrate 100 MG Oral Tab Take 1 tablet (100 mg total) by mouth daily as needed for Erectile Dysfunction. 10 tablet 0    gabapentin 600 MG Oral Tab Take 1 tablet (600 mg total) by mouth 2 (two) times daily. 180 tablet 3    ASPIRIN ADULT LOW STRENGTH 81 MG Oral Tab EC Take 1 tablet (81 mg total) by mouth daily.  90 tablet 0      Past Medical History:    Abnormal fasting glucose    Colon polyps    repeat CLN in 5-7 years    Essential hypertension    High blood pressure    High cholesterol    Hypercholesterolemia    Hyperlipidemia      Past Surgical History:   Procedure Laterality Date    Anterior cruciate ligament repair Right     Back surgery  2022    l3-4-5    Colonoscopy N/A 12/08/2021    Procedure: COLONOSCOPY;  Surgeon: SHAMAR Ramírez MD;  Location: [facility] ENDOSCOPY    Other surgical history      deviated septum    Other surgical history Left     knee arthroscopy - meniscal tear    Tonsillectomy        Social History:  Social History     Socioeconomic History    Marital status:     Number  of children: 2   Tobacco Use    Smoking status: Former     Current packs/day: 0.00     Average packs/day: 0.8 packs/day for 14.0 years (10.5 ttl pk-yrs)     Types: Cigarettes     Start date: 1989     Quit date: 2003     Years since quittin.7    Smokeless tobacco: Never   Vaping Use    Vaping status: Never Used   Substance and Sexual Activity    Alcohol use: Yes     Alcohol/week: 5.0 standard drinks of alcohol     Types: 5 Glasses of wine per week     Comment: 5 drinks/week    Drug use: No     Social Determinants of Health      [facility]    Housing Stability      Family History   Problem Relation Age of Onset    Cancer Father         Stomach CA    Hypertension Father     Heart Disorder Father         MI    Other (Other) Maternal Grandmother         Alzheimers    Other (Other) Paternal Grandmother         Alzheimers    Heart Disorder Brother         MI      No Known Allergies     REVIEW OF SYSTEMS:   Review of Systems   Review of Systems   Constitutional: Negative for activity change, appetite change and fever.   HENT: Negative for congestion and voice change.    Respiratory: Negative for cough and shortness of breath.    Cardiovascular: Negative for chest pain.   Gastrointestinal: Negative for abdominal distention, abdominal pain and vomiting.   Genitourinary: Negative for hematuria.   Skin: Negative for wound.   Psychiatric/Behavioral: Negative for behavioral problems.  Back pain present  Wt Readings from Last 5 Encounters:   10/08/24 196 lb (88.9 kg)   24 198 lb (89.8 kg)   24 197 lb (89.4 kg)   23 192 lb (87.1 kg)   23 198 lb (89.8 kg)     Body mass index is 32.62 kg/m².      EXAM:   /84   Pulse 75   Ht 5' 5\" (1.651 m)   Wt 196 lb (88.9 kg)   SpO2 99%   BMI 32.62 kg/m²   Physical Exam   Constitutional:       Appearance: Normal appearance.   HENT:      Head: Normocephalic.   Eyes:       Conjunctiva/sclera: Conjunctivae normal.   Cardiovascular:      Rate and Rhythm: Normal rate and regular rhythm.      Heart sounds: Normal heart sounds. No murmur heard.  Pulmonary:      Effort: Pulmonary effort is normal.      Breath sounds: Normal breath sounds. No rhonchi or rales.   Abdominal:      General: Bowel sounds are normal.      Palpations: Abdomen is soft.      Tenderness: There is no abdominal tenderness.   Musculoskeletal:      Cervical back: Neck supple.      Right lower leg: No edema.      Left lower leg: No edema.   Skin:     General: Skin is warm and dry.   Neurological:      General: No focal deficit present.      Mental Status: He is alert and oriented to person, place, and time. Mental status is at baseline.   Psychiatric:         Mood and Affect: Mood normal.         Behavior: Behavior normal.       ASSESSMENT AND PLAN:   1. Benign essential hypertension  Controlled.  He completes blood testing at the outside institution.  I do not have access to it.    2. Mixed hyperlipidemia  Continue statins.  Encouraged him to fax me the blood test test report.    3. Anxiety  Stable continue fluoxetine    4. Osteoarthritis of lumbar spine, unspecified spinal osteoarthritis complication status  Status post surgery at outside institution.  He is requesting for a second opinion at [facility].  Referral placed.  - NEUROSURGERY - INTERNAL    Plan: As above.  I will see him back in 6 months      The patient indicates understanding of these issues and agrees to the plan.  No follow-ups on file.    This note was prepared using Dragon [employer] voice recognition dictation software. As a result errors may occur. When identified these errors have been corrected. While every attempt is made to correct errors during dictation discrepancies may still exist.

## 2024-12-02 ENCOUNTER — MED REC SCAN ONLY (OUTPATIENT)
Dept: INTERNAL MEDICINE CLINIC | Facility: CLINIC | Age: 59
End: 2024-12-02

## 2025-04-09 ENCOUNTER — OFFICE VISIT (OUTPATIENT)
Dept: INTERNAL MEDICINE CLINIC | Facility: CLINIC | Age: 60
End: 2025-04-09
Payer: COMMERCIAL

## 2025-04-09 VITALS
SYSTOLIC BLOOD PRESSURE: 128 MMHG | OXYGEN SATURATION: 100 % | HEART RATE: 67 BPM | DIASTOLIC BLOOD PRESSURE: 82 MMHG | BODY MASS INDEX: 32.32 KG/M2 | HEIGHT: 65 IN | TEMPERATURE: 97 F | WEIGHT: 194 LBS

## 2025-04-09 DIAGNOSIS — Z00.00 ADULT GENERAL MEDICAL EXAM: Primary | ICD-10-CM

## 2025-04-09 PROCEDURE — 99396 PREV VISIT EST AGE 40-64: CPT | Performed by: INTERNAL MEDICINE

## 2025-04-09 NOTE — PROGRESS NOTES
Mukul Mendes is a 59 year old male.  Chief Complaint   Patient presents with    Physical    Hernia     Pt would like to discuss hernia     HPI:     59-year-old gentleman here for physical.  He report.  His back pain is significantly better.  He is going for a skiing trip soon.  He has no complaints.         Past medical history   hypertension, dyslipidemia, lumbar spine disease, GERD, ED     SHX knee surgery for ACL repair, tonsillectomy and septoplasty., laminectomy in [place]     He is not allergic any medication.     He does not smoke, denies alcohol or recreational drug abuse.     Family history-his father had gastric cancer (not colon cancer).  He denies any prostate cancer.  His brother had heart disease at the age of 40.  His dad has heart disease at the age of 60.  He denies any venous thromboembolism.     Copied from [employer] note     57 yo R handed . Previously very active with water and snow skiing, bike riding, roller blading. [place] on back when shoreline gave way. Had previously had c/o intermittent back stiffness, aching and spasms. Back pain worsened after fall. [facility] for [facility] sessions, used heat, did inversion table and PT. Pt ultimately had surgery at [facility] with Dr. Navas in Jan '22:  \"Microscopic L3, L4 and L5 lumbar laminectomy with bilateral partial facetectomy and foraminotomy\".  Postoperativelly, pt c/o lightheadedness, dizziness, headache that worsened with standing for several months. He now endorses painful stabbing sensations in the central low back/sacral region and the R buttock with occasional radiation to the R posterior thigh. He also endorses severe burning pain in the anus that radiates to the R testicle; states it feels as if someone is trying to rip it off. He denies bladder dysfunction but reports erectile dysfunction with loss of sensation. Bowel movements are painful when he bears down. Also with increased  pressure in low back prior to BM.   If he reaches arms out or looks up, he has a pulling sensation in the low back. He can walk about one block. He has difficulty with prolonged sitting. He denies weakness in the legs.   Pt had postoperative PT and first LEONELA on 8/3/21. States it improved pain x 1 week but at 2nd week, pain was worse than before. He is taking Tramadol PRN and 1200 mg Neurontin per day        Copied  from care everywhere  Dr. De La Torre offered L3-5 laminectomies for residual stenosis. If leg pain persists postoperatively, may be candidate for DCS.  Pt wishes to consider his options and will call office if he wishes to proceed    Current Medications[1]   Past Medical History[2]   Past Surgical History[3]   Social History:  Short Social Hx on File[4]   Family History[5]   Allergies[6]     REVIEW OF SYSTEMS:   Review of Systems   Review of Systems   Constitutional: Negative for activity change, appetite change and fever.   HENT: Negative for congestion and voice change.    Respiratory: Negative for cough and shortness of breath.    Cardiovascular: Negative for chest pain.   Gastrointestinal: Negative for abdominal distention, abdominal pain and vomiting.   Genitourinary: Negative for hematuria.   Skin: Negative for wound.   Psychiatric/Behavioral: Negative for behavioral problems.   Wt Readings from Last 5 Encounters:   04/09/25 194 lb (88 kg)   10/08/24 196 lb (88.9 kg)   05/16/24 198 lb (89.8 kg)   04/02/24 197 lb (89.4 kg)   06/26/23 192 lb (87.1 kg)     Body mass index is 32.28 kg/m².      EXAM:   /82   Pulse 67   Temp 97.3 °F (36.3 °C) (Temporal)   Ht 5' 5\" (1.651 m)   Wt 194 lb (88 kg)   SpO2 100%   BMI 32.28 kg/m²   Physical Exam   Constitutional:       Appearance: Normal appearance.   HENT:      Head: Normocephalic.   Eyes:      Conjunctiva/sclera: Conjunctivae normal.   Cardiovascular:      Rate and Rhythm: Normal rate and regular rhythm.      Heart sounds: Normal heart sounds. No  murmur heard.  Pulmonary:      Effort: Pulmonary effort is normal.      Breath sounds: Normal breath sounds. No rhonchi or rales.   Abdominal:      General: Bowel sounds are normal.      Palpations: Abdomen is soft.      Tenderness: There is no abdominal tenderness.   Musculoskeletal:      Cervical back: Neck supple.      Right lower leg: No edema.      Left lower leg: No edema.   Skin:     General: Skin is warm and dry.   Neurological:      General: No focal deficit present.      Mental Status: He is alert and oriented to person, place, and time. Mental status is at baseline.   Psychiatric:         Mood and Affect: Mood normal.         Behavior: Behavior normal.       ASSESSMENT AND PLAN:   1. Adult general medical exam  Continue to eat healthy diet.  Fruits and vegetables.  Exercise regularly.  Up-to-date on colonoscopy.  Updated PSA.  Reviewed vaccines including Shingrix and PCV 20.    Hypertension controlled    Dyslipidemia-continue statins    Anxiety-stable    History of lower back spine disease stable    Plan: As above.  I will see him back in 6 months    The patient indicates understanding of these issues and agrees to the plan.  No follow-ups on file.    This note was prepared using Dragon [employer] voice recognition dictation software. As a result errors may occur. When identified these errors have been corrected. While every attempt is made to correct errors during dictation discrepancies may still exist.         [1]   Current Outpatient Medications   Medication Sig Dispense Refill    atorvastatin 20 MG Oral Tab Take 1 tablet (20 mg total) by mouth nightly. 90 tablet 3    FLUoxetine 10 MG Oral Cap Take 1 capsule (10 mg total) by mouth daily. 90 capsule 3    lisinopril 40 MG Oral Tab Take 1 tablet (40 mg total) by mouth daily. 90 tablet 3    omeprazole 20 MG Oral Capsule Delayed Release Take 1 capsule (20 mg total) by mouth every morning. 90 capsule 2    naproxen 500 MG Oral Tab Take 1 tablet (500 mg total)  by mouth 2 (two) times daily before meals. 30 tablet 1    Sildenafil Citrate 100 MG Oral Tab Take 1 tablet (100 mg total) by mouth daily as needed for Erectile Dysfunction. 10 tablet 0    ASPIRIN ADULT LOW STRENGTH 81 MG Oral Tab EC Take 1 tablet (81 mg total) by mouth daily.  90 tablet 0    gabapentin 600 MG Oral Tab Take 1 tablet (600 mg total) by mouth 2 (two) times daily. (Patient not taking: Reported on 2025) 180 tablet 3   [2]   Past Medical History:   Abnormal fasting glucose    Colon polyps    repeat CLN in 5-7 years    Essential hypertension    High blood pressure    High cholesterol    Hypercholesterolemia    Hyperlipidemia   [3]   Past Surgical History:  Procedure Laterality Date    Anterior cruciate ligament repair Right     Back surgery      l3-4-5    Colonoscopy N/A 2021    Procedure: COLONOSCOPY;  Surgeon: SHAMAR Ramírez MD;  Location: [facility] ENDOSCOPY    Other surgical history      deviated septum    Other surgical history Left     knee arthroscopy - meniscal tear    Tonsillectomy     [4]   Social History  Socioeconomic History    Marital status:     Number of children: 2   Tobacco Use    Smoking status: Former     Current packs/day: 0.00     Average packs/day: 0.8 packs/day for 14.0 years (10.5 ttl pk-yrs)     Types: Cigarettes     Start date: 1989     Quit date: 2003     Years since quittin.2    Smokeless tobacco: Never   Vaping Use    Vaping status: Never Used   Substance and Sexual Activity    Alcohol use: Yes     Alcohol/week: 5.0 standard drinks of alcohol     Types: 5 Glasses of wine per week     Comment: 5 drinks/week    Drug use: No     Social Drivers of Health     Food Insecurity: No Food Insecurity (2025)    NCSS - Food Insecurity     Worried About Running Out of Food in the Last Year: No     Ran Out of Food in the Last Year: No   Transportation Needs: No Transportation Needs (2025)    NCSS - Transportation     Lack of Transportation: No   Housing  Stability: Not At Risk (4/9/2025)    NCSS - Housing/Utilities     Has Housing: Yes     Worried About Losing Housing: No     Unable to Get Utilities: No   [5]   Family History  Problem Relation Age of Onset    Cancer Father         Stomach CA    Hypertension Father     Heart Disorder Father         MI    Other (Other) Maternal Grandmother         Alzheimers    Other (Other) Paternal Grandmother         Alzheimers    Heart Disorder Brother         MI   [6] No Known Allergies

## 2025-04-21 ENCOUNTER — APPOINTMENT (OUTPATIENT)
Dept: CT IMAGING | Facility: HOSPITAL | Age: 60
End: 2025-04-21
Attending: EMERGENCY MEDICINE
Payer: COMMERCIAL

## 2025-04-21 ENCOUNTER — APPOINTMENT (OUTPATIENT)
Dept: GENERAL RADIOLOGY | Facility: HOSPITAL | Age: 60
End: 2025-04-21
Attending: EMERGENCY MEDICINE
Payer: COMMERCIAL

## 2025-04-21 ENCOUNTER — HOSPITAL ENCOUNTER (EMERGENCY)
Facility: HOSPITAL | Age: 60
Discharge: HOME | End: 2025-04-21
Attending: EMERGENCY MEDICINE
Payer: COMMERCIAL

## 2025-04-21 VITALS
TEMPERATURE: 98 F | OXYGEN SATURATION: 98 % | RESPIRATION RATE: 17 BRPM | HEART RATE: 75 BPM | SYSTOLIC BLOOD PRESSURE: 133 MMHG | DIASTOLIC BLOOD PRESSURE: 98 MMHG | WEIGHT: 190 LBS | BODY MASS INDEX: 32 KG/M2

## 2025-04-21 DIAGNOSIS — S09.90XA INJURY OF HEAD, INITIAL ENCOUNTER: ICD-10-CM

## 2025-04-21 DIAGNOSIS — S22.39XA CLOSED FRACTURE OF ONE RIB, UNSPECIFIED LATERALITY, INITIAL ENCOUNTER: Primary | ICD-10-CM

## 2025-04-21 PROCEDURE — 99284 EMERGENCY DEPT VISIT MOD MDM: CPT

## 2025-04-21 PROCEDURE — 72072 X-RAY EXAM THORAC SPINE 3VWS: CPT | Performed by: EMERGENCY MEDICINE

## 2025-04-21 PROCEDURE — 71101 X-RAY EXAM UNILAT RIBS/CHEST: CPT | Performed by: EMERGENCY MEDICINE

## 2025-04-21 PROCEDURE — 72125 CT NECK SPINE W/O DYE: CPT | Performed by: EMERGENCY MEDICINE

## 2025-04-21 PROCEDURE — 70450 CT HEAD/BRAIN W/O DYE: CPT | Performed by: EMERGENCY MEDICINE

## 2025-04-21 RX ORDER — SENNA AND DOCUSATE SODIUM 50; 8.6 MG/1; MG/1
1 TABLET, FILM COATED ORAL DAILY
Qty: 30 TABLET | Refills: 0 | Status: SHIPPED | OUTPATIENT
Start: 2025-04-21

## 2025-04-21 RX ORDER — HYDROCODONE BITARTRATE AND ACETAMINOPHEN 5; 325 MG/1; MG/1
1-2 TABLET ORAL EVERY 4 HOURS PRN
Qty: 10 TABLET | Refills: 0 | Status: SHIPPED | OUTPATIENT
Start: 2025-04-21 | End: 2025-04-28

## 2025-04-21 NOTE — ED PROVIDER NOTES
Patient Seen in: [facility]         EMERGENCY DEPARTMENT NOTE    Dictated. Voice Transcription software has been utilized for this dictation (the reader should be aware that typographical errors are possible with voice transcription software and to please contact the dictating physician if there are questions.)         History     Chief Complaint   Patient presents with    Fall       There may be discrepancies from triage note.     HPI    History provided by patient and patient's wife.  59-year-old male, history of hypertension, history of baby aspirin use daily with last aspirin use this morning, complaining of right posterior lateral mid rib pain status post mechanical fall sustained yesterday around 4 PM.  States that he was on a banister adjusting a flag when he fell backwards onto his back onto concrete.  Patient reports hitting the posterior superior aspect of his head onto concrete.  Lost consciousness for possibly a few secondsand felt slightly disoriented thereafter however denies any disorientation at this time.  Denies midline neck pain.  Fell from 5 feet.  Wife at bedside states that patient is acting normally.  No confusion at this time  Wife at bedside states that patient is acting normally.  Patient states that his primary area of pain is in his right posterior lateral rib area.  Denies abdominal pain.  No chest pain, shortness of breath.  Currently declines analgesia until after imaging.    No fevers, chills, nausea, vomiting, diarrhea, constipation, cough, cold symptoms, urinary complaints.  No chest pain, shortness of breath  No headache, neck stiffness, incontinence.  No changes in mentation, no changes in vision, no total/new extremity weakness, no total/new extremity paresthesia, no difficulty speaking.  No alleviating or exacerbating factors.  Denies orthopnea, pnd, change in exercise tolerance limited by chest pain/sob , lower extremity edema/asymmetry.   Denies hematuria, blood in  stool.  Denies fevers, weight loss, urinary/bowel incontinence, weakness, paresthesias, intravenous drug use, recent neck/back surgeries/manipulation.           History reviewed. Past Medical History[1]    History reviewed. Past Surgical History[2]      Medications :  Prescriptions Prior to Admission[3]     Family History[4]    Smoking Status: Social Hx on file[5]    Review of Systems   Constitutional: Negative.    HENT: Negative.     Eyes: Negative.  Negative for blurred vision, double vision, photophobia, pain, discharge and redness.   Respiratory: Negative.  Negative for shortness of breath.    Cardiovascular: Negative.  Negative for chest pain.   Gastrointestinal: Negative.  Negative for abdominal pain.   Genitourinary: Negative.    Musculoskeletal:  Positive for back pain.   Skin: Negative.    Neurological: Negative.  Negative for dizziness.   Endo/Heme/Allergies: Negative.    Psychiatric/Behavioral: Negative.     All other systems reviewed and are negative.    Pertinent positives as listed.  All other organ systems are reviewed and are negative.    Constitutional and vital signs reviewed.      Social History and Family History elements reviewed from today, pertinent positives to the presenting problem noted.    Physical Exam     ED Triage Vitals   BP 04/21/25 0756 (!) 151/100   Pulse 04/21/25 0756 88   Resp 04/21/25 0756 18   Temp 04/21/25 0756 98 °F (36.7 °C)   Temp src --    SpO2 04/21/25 0756 98 %   O2 Device 04/21/25 0800 None (Room air)       All measures to prevent infection transmission during my interaction with the patient were taken. The patient was already wearing a droplet mask on my arrival to the room. Personal protective equipment including droplet mask, eye protection, and gloves were worn throughout the duration of the exam.  Handwashing was performed prior to and after the exam.  Stethoscope and any equipment used during my examination was cleaned with super sani-cloth germicidal wipes  following the exam.     Physical Exam  Vitals and nursing note reviewed.   Constitutional:       General: He is not in acute distress.     Appearance: He is not ill-appearing or toxic-appearing.      Comments: Smiles     HENT:      Head: Normocephalic.      Mouth/Throat:      Pharynx: No oropharyngeal exudate.   Eyes:      General:         Right eye: No discharge.         Left eye: No discharge.      Extraocular Movements: Extraocular movements intact.      Conjunctiva/sclera: Conjunctivae normal.      Pupils: Pupils are equal, round, and reactive to light.   Neck:      Comments: No tenderness to cervical spine, no step-offs. normal range of motion of neck    Cardiovascular:      Rate and Rhythm: Normal rate and regular rhythm.      Comments: Radial pulses 2+ bilaterally  .Dorsalis pedis pulses 2+ bilaterally      Pulmonary:      Effort: Pulmonary effort is normal. No respiratory distress.      Breath sounds: No stridor. No wheezing, rhonchi or rales.   Chest:      Chest wall: No tenderness.   Abdominal:      General: There is no distension.      Palpations: Abdomen is soft.      Tenderness: There is no abdominal tenderness. There is no right CVA tenderness, left CVA tenderness, guarding or rebound.      Comments: Negative Perez sign, negative McBurney's point tenderness     Musculoskeletal:      Cervical back: Normal range of motion and neck supple.      Right lower leg: No edema.      Left lower leg: No edema.      Comments: No spinal tenderness diffusely.  No step-offs.  Normal range of motion of back however pain reproducible with torso range of motion to right posterior mid to lateral ribs.  No step-offs to ribs.  Tenderness to mid to lateral right posterior ribs to palpation, no crepitus.      Patient able to stand on the  tip of toes, heels.  Patient able to squat       Skin:     Capillary Refill: Capillary refill takes less than 2 seconds.      Coloration: Skin is not jaundiced or pale.      Findings: No  bruising, erythema, lesion or rash.   Neurological:      Mental Status: He is alert.      Comments: Cranial nerves 3-12 intact.    5/5 bilateral finger , biceps, triceps, leg extension/flexion, dorsiflexion/plantarflexion.  Sensory function intact symmetrically bilaterally to face, upper extremities and lower extremities to soft touch.  Normal finger-to-nose.  Steady gait  Negative pronator drift       Psychiatric:         Mood and Affect: Mood normal.         Behavior: Behavior normal.           Review of prior notes in Care everywhere/Epic performed by myself:  Patient recently saw his primary care provider April 9.  Hypertension was controlled at that time.  Will repeat blood pressure today  Echocardiogram November 2016 with ejection fraction of 55 to 60%, no      ED Course     If labs obtained, they are personally reviewed by myself:   Labs Reviewed - No data to display    If radiologic studies ordered during today's ER visit, my independent interpretation are seen directly below.  This is awaiting the radiologist's final interpretation.  CT cervical spine, independent interpretation of radiologic study completed by myself and awaiting formal radiologist interpretation: No obvious fracture noted.  CT brain, independent interpretation completed by myself, awaiting  formal radiology read: No obvious intracranial hemorrhage.      Rib series x-ray with chest x-ray, independent interpretation of radiologic study completed by myself and awaiting formal radiologist interpretation: No acute fracture, no pneumothorax noted.    Thoracic spine x-ray, independent interpretation of radiologic study completed by myself and awaiting formal radiologist interpretation: No acute fracture or dislocation      Imaging Results read by radiology in ED: CT BRAIN OR HEAD (CPT=70450)  Result Date: 4/21/2025  CONCLUSION: Large hematoma in the posterior-lateral right parietal-occipital scalp.  No acute calvarial fracture.  No acute  intracranial process.   Dictated by (CST): Ag Tipton MD on 4/21/2025 at 11:05 AM     Finalized by (CST): Ag Tipton MD on 4/21/2025 at 11:07 AM          CT SPINE CERVICAL (CPT=72125)  Result Date: 4/21/2025  CONCLUSION:  1. No acute fracture or posttraumatic malalignment cervical spine is demonstrated.  2. Multilevel degenerative disc degeneration.  3. Lesser incidental findings as above.    Dictated by (CST): Christian Ramírez MD on 4/21/2025 at 10:59 AM     Finalized by (CST): Christian Ramírez MD on 4/21/2025 at 11:01 AM          XR RIBS WITH CHEST (3 VIEWS), RIGHT  (CPT=71101)  Addendum Date: 4/21/2025  This report includes an Addendum and supersedes previous reports for this exam.    PROCEDURE: XR RIBS WITH CHEST (3 VIEWS), RIGHT (CPT=71101)  COMPARISON: None.  INDICATIONS: Right posterior rib pain post fall x 1 day.  TECHNIQUE:   Four views.   FINDINGS:   BONES: Scattered mild degenerative endplate change within the spine.  No acute right rib fracture or deformity. SOFT TISSUES: No visible soft tissue swelling.  LUNGS: Calcified granuloma in the right base.  Linear bands of atelectasis/scar in the left lung base. PLEURA: Normal. OTHER: Negative.  CONCLUSION: No acute cardiopulmonary abnormality.  No acute right rib fracture or deformity.  (Please see addendum below)   Dictated by (CST): Ag Tipton MD on 4/21/2025 at 9:42 AM     Finalized by (CST): Ag Tipton MD on 4/21/2025 at 9:43 AM    ADDENDUM:  When correlated with recent thoracic spine radiographs, there is an acute nondisplaced right posterior 11th rib fracture is present.  Dictated by (CST): Ag Tipton MD on 4/21/2025 at 9:49 AM     Finalized by (CST): Ag Tipton MD on 4/21/2025 at 9:49 AM             Result Date: 4/21/2025  CONCLUSION: No acute cardiopulmonary abnormality.  No acute right rib fracture or deformity.   Dictated by (CST): Ag Tipton MD on 4/21/2025 at 9:42 AM     Finalized by (CST): Ag Tipton MD on 4/21/2025 at 9:43 AM           XR THORACIC SPINE (3 VIEWS) (CPT=72072)  Result Date: 4/21/2025  CONCLUSION:  1.  Nondisplaced acute appearing right posterior 11th rib fracture. 2.  No acute osseous abnormality of the thoracic spine.  Chronic appearing disc disease and arthritis within the spine.   Dictated by (CST): Ag Tipton MD on 4/21/2025 at 9:44 AM     Finalized by (CST): Ag Tipton MD on 4/21/2025 at 9:48 AM                ED Medications Administered: Medications - No data to display        Vitals:    04/21/25 0756 04/21/25 0900 04/21/25 0930 04/21/25 0945   BP: (!) 151/100 132/88 130/76 136/76   Pulse: 88 76 75 74   Resp: 18      Temp: 98 °F (36.7 °C)      SpO2: 98% 96% 98% 98%   Weight: 86.2 kg        *I personally reviewed and interpreted all ED vitals.    Pulse Ox interpretation by myself: 98%, Room air, Normal     Monitor Interpretation by myself:   normal sinus rhythm    If Ekg obtained during today's visit, it is independently interpreted by myself directly below:      Medical Record Review: I personally reviewed available prior medical records for any recent pertinent discharge summaries, testing, and procedures and reviewed those reports.      [facility]     Medical decision making/ED Course:   59-year-old male status post mechanical fall sustained yesterday complaining of right posterior lateral rib pain.  He denies lower back pain, abdominal pain.  Denies bleeding from any orifice.  Patient reports hitting his head with questionable LOC for a few seconds.  Neurologically and vascularly intact.  Tenderness elicited to posterior lateral inferior rib, no step-offs.  Thankfully breath sounds are equal, he saturating 97% on room air.  His symptoms seem most consistent with rib fracture.  Rib series x-ray with no pneumothorax.  Thoracic spine x-ray with no spinal fracture, 11th posterior rib fracture noted.  Thankfully CT brain and cervical spine negative for acute intracranial pathology.  No fracture, no intracranial bleed.  He is  tolerating p.o.  Norco given and Norco prescription prescribed.  Case discussed with respiratory therapist who will assist with teaching incentive spirometry  Patient denies bleeding from any orifice.  Denies abdominal pain.  Repeat abdominal exam with no tenderness.  He is comfortable with no further workup/abdominal imaging at this time.  Norco prescribed.    He is tolerating p.o.    Traumatic CVA, cervical spine fracture, spine fracture, traumatic chest injury (i.e. pneumothorax, cardiac contusion, dissection etc), organ abdominal injury (bowel perforation, organ perforation), among other life-threatening medical conditions considered and seems unlikely given patient's history, exam, and appearance.  Strict return instructions given.  Patient encouraged to follow-up with primary care provider in the next few days.  Advised to return to the emergency department for any worsening symptoms      Patient is non toxic appearing, is in no distress, hemodynamically stable.  Pt agrees and is aware of plan.               Differential Diagnosis:  as listed above in medical decision making.   *Please note that in the presenting to the emergency department, illness/injury that poses a threat to life or function is considered during this patient's initial evaluation.    The complexity of this visit is therefore inherently more complex given the need to consider life threatening pathology prior to any other etiology for this patient's visit.    The differential diagnosis and medical decision above exemplify this rationale.       Medical Decision Making  Problems Addressed:  Closed fracture of one rib, unspecified laterality, initial encounter: acute illness or injury    Amount and/or Complexity of Data Reviewed  Independent Historian: spouse  External Data Reviewed: notes.  Radiology: ordered and independent interpretation performed. Decision-making details documented in ED Course.    Risk  Prescription drug  management.               Vitals:    04/21/25 0756 04/21/25 0900 04/21/25 0930 04/21/25 0945   BP: (!) 151/100 132/88 130/76 136/76   Pulse: 88 76 75 74   Resp: 18      Temp: 98 °F (36.7 °C)      SpO2: 98% 96% 98% 98%   Weight: 86.2 kg                Complicating Factors: Significant medical problems that contribute to the complexity of this emergency room evaluation is listed above.    Condition upon leaving the department: Stable    Disposition and Plan     Clinical Impression:  1. Closed fracture of one rib, unspecified laterality, initial encounter    2. Injury of head, initial encounter        Disposition:  Discharge    Medications Prescribed:  Current Discharge Medication List        START taking these medications    Details   HYDROcodone-acetaminophen 5-325 MG Oral Tab Take 1-2 tablets by mouth every 4 (four) hours as needed.  Qty: 10 tablet, Refills: 0    Associated Diagnoses: Closed fracture of one rib, unspecified laterality, initial encounter      senna-docusate 8.6-50 MG Oral Tab Take 1 tablet by mouth daily.  Qty: 30 tablet, Refills: 0    Associated Diagnoses: Closed fracture of one rib, unspecified laterality, initial encounter      Naloxone HCl 4 MG/0.1ML Nasal Liquid 4 mg by Nasal route as needed. If patient remains unresponsive, repeat dose in other nostril 2-5 minutes after first dose.  Qty: 1 kit, Refills: 0             I have discussed the discharge plan with the patient and/or family or well wisher present in the room with the patient's permission.  They state that they understand and agree with the plan.  All questions regarding their care have been answered prior to discharge.  They are aware: Emergency Department is not intended to be a substitute for an effort to provide complete medical care. The imaging, if any, have often been interpreted on a preliminary basis pending final reading by the radiologist.  Instructed to return immediately to the ED if any changes or worsening of condition  should occur.  If patient's blood pressure was greater than 140/90 today, patient encouraged to have this blood pressure rechecked with primary MD and blood pressure education provided.                       [1]   Past Medical History:   Abnormal fasting glucose    Colon polyps    repeat CLN in 5-7 years    Essential hypertension    High blood pressure    High cholesterol    Hypercholesterolemia    Hyperlipidemia   [2]   Past Surgical History:  Procedure Laterality Date    Anterior cruciate ligament repair Right     Back surgery      l3-4-5    Colonoscopy N/A 2021    Procedure: COLONOSCOPY;  Surgeon: SHAMAR Ramírez MD;  Location: [facility] ENDOSCOPY    Other surgical history      deviated septum    Other surgical history Left     knee arthroscopy - meniscal tear    Tonsillectomy     [3] (Not in a hospital admission)   [4]   Family History  Problem Relation Age of Onset    Cancer Father         Stomach CA    Hypertension Father     Heart Disorder Father         MI    Other (Other) Maternal Grandmother         Alzheimers    Other (Other) Paternal Grandmother         Alzheimers    Heart Disorder Brother         MI   [5]   Social History  Socioeconomic History    Marital status:     Number of children: 2   Tobacco Use    Smoking status: Former     Current packs/day: 0.00     Average packs/day: 0.8 packs/day for 14.0 years (10.5 ttl pk-yrs)     Types: Cigarettes     Start date: 1989     Quit date: 2003     Years since quittin.3    Smokeless tobacco: Never   Vaping Use    Vaping status: Never Used   Substance and Sexual Activity    Alcohol use: Yes     Alcohol/week: 5.0 standard drinks of alcohol     Types: 5 Glasses of wine per week     Comment: 5 drinks/week    Drug use: No

## 2025-04-21 NOTE — ED INITIAL ASSESSMENT (HPI)
Patient states fell and hit his head yesterday, states the fall was from about five feet, per wife patient was disoriented after falling, states he also feels like he hit his back on stairs

## 2025-04-21 NOTE — DISCHARGE INSTRUCTIONS
Please return to the emergency department for fevers of 100.4, irregularity appearance of your chest wall, cough, weakness, significant pain not alleviated with medication.  Follow-up with your primary care provider in the next few days.  You may need additional pain medication for your pain.  Please use incentive spirometer 3 times a day to prevent pneumonia.      You sustained a head injury today.  After a head injury you are always at risk for delayed brain bleed.  Return to ER for any changes in mentation, significant headaches, weakness, numbness, losing stool/urine on yourself.    The Emergency Department is not intended to be a substitute for an effort to provide complete medical care. The imaging, if any, have often been interpreted on a preliminary basis pending final reading by the radiologist. If your blood pressure was greater than 140/90, please have this blood pressure rechecked by your primary care provider wihtin the next few days. You will benefit from a further discussion of lifestyle modifications that include Weight Reduction - Dietary Sodium Restriction - Increased Physical Activity and Moderation in alcohol (ETOH) Consumption. If possible check your pressure at home and keep a blood pressure log to bring to your physician.

## 2025-04-28 ENCOUNTER — OFFICE VISIT (OUTPATIENT)
Dept: INTERNAL MEDICINE CLINIC | Facility: CLINIC | Age: 60
End: 2025-04-28
Payer: COMMERCIAL

## 2025-04-28 VITALS
HEIGHT: 65 IN | WEIGHT: 194 LBS | SYSTOLIC BLOOD PRESSURE: 126 MMHG | BODY MASS INDEX: 32.32 KG/M2 | HEART RATE: 77 BPM | TEMPERATURE: 98 F | OXYGEN SATURATION: 97 % | DIASTOLIC BLOOD PRESSURE: 78 MMHG

## 2025-04-28 DIAGNOSIS — S22.31XS CLOSED FRACTURE OF ONE RIB OF RIGHT SIDE, SEQUELA: Primary | ICD-10-CM

## 2025-04-28 RX ORDER — HYDROCODONE BITARTRATE AND ACETAMINOPHEN 5; 325 MG/1; MG/1
1 TABLET ORAL EVERY 12 HOURS PRN
Qty: 14 TABLET | Refills: 0 | Status: SHIPPED | OUTPATIENT
Start: 2025-04-28 | End: 2025-05-05

## 2025-04-28 NOTE — PROGRESS NOTES
Mukul Mendes is a 59 year old male.  Chief Complaint   Patient presents with    ER F/U     Was in ER on 4/21 for Closed fracture of one rib, unspecified laterality, initial encounter, Injury of head, initial encounter, pt states he has been feeling sore from the fall     HPI:   59-year-old gentleman here for follow-up.  He had a mechanical fall, was seen in the emergency room found to have rib fracture.  Denies any chest pain or shortness of breath.  Rib cage pain present with activity such as sneezing coughing moving laughing.  He is weaning off on his pain medication.      Current Medications[1]   Past Medical History[2]   Past Surgical History[3]   Social History:  Short Social Hx on File[4]   Family History[5]   Allergies[6]     REVIEW OF SYSTEMS:   Review of Systems   Review of Systems   Constitutional: Negative for activity change, appetite change and fever.   HENT: Negative for congestion and voice change.    Respiratory: Negative for cough and shortness of breath.    Cardiovascular: Negative for chest pain.   Psychiatric/Behavioral: Negative for behavioral problems.   Wt Readings from Last 5 Encounters:   04/28/25 194 lb (88 kg)   04/21/25 190 lb (86.2 kg)   04/09/25 194 lb (88 kg)   10/08/24 196 lb (88.9 kg)   05/16/24 198 lb (89.8 kg)     Body mass index is 32.28 kg/m².      EXAM:   /78   Pulse 77   Temp 97.7 °F (36.5 °C) (Temporal)   Ht 5' 5\" (1.651 m)   Wt 194 lb (88 kg)   SpO2 97%   BMI 32.28 kg/m²   Physical Exam   Constitutional:       Appearance: Normal appearance.   HENT:      Head: Normocephalic.    Cardiovascular:      Rate and Rhythm: Normal rate and regular rhythm.      Heart sounds: Normal heart sounds. No murmur heard.  Pulmonary:      Effort: Pulmonary effort is normal.      Breath sounds: Normal breath sounds. No rhonchi or rales.   Musculoskeletal:      Cervical back: Neck supple.      Right lower leg: No edema.      Left lower leg: No edema.   Skin:     General: Skin is  warm and dry.   Neurological:      General: No focal deficit present.      Mental Status: He is alert and oriented to person, place, and time. Mental status is at baseline.   Psychiatric:         Mood and Affect: Mood normal.         Behavior: Behavior normal.       ASSESSMENT AND PLAN:   1. Closed fracture of one rib of right side, sequela  Wean off of pain medicine as tolerated.  Provided 14 more tablets.  Breathing exercises to prevent atelectasis.  - HYDROcodone-acetaminophen 5-325 MG Oral Tab; Take 1 tablet by mouth every 12 (twelve) hours as needed.  Dispense: 14 tablet; Refill: 0    Plan: As above.      The patient indicates understanding of these issues and agrees to the plan.  No follow-ups on file.    This note was prepared using Dragon Medical voice recognition dictation software. As a result errors may occur. When identified these errors have been corrected. While every attempt is made to correct errors during dictation discrepancies may still exist.       [1]   Current Outpatient Medications   Medication Sig Dispense Refill    HYDROcodone-acetaminophen 5-325 MG Oral Tab Take 1 tablet by mouth every 12 (twelve) hours as needed. 14 tablet 0    senna-docusate 8.6-50 MG Oral Tab Take 1 tablet by mouth daily. 30 tablet 0    Naloxone HCl 4 MG/0.1ML Nasal Liquid 4 mg by Nasal route as needed. If patient remains unresponsive, repeat dose in other nostril 2-5 minutes after first dose. 1 kit 0    atorvastatin 20 MG Oral Tab Take 1 tablet (20 mg total) by mouth nightly. 90 tablet 3    FLUoxetine 10 MG Oral Cap Take 1 capsule (10 mg total) by mouth daily. 90 capsule 3    lisinopril 40 MG Oral Tab Take 1 tablet (40 mg total) by mouth daily. 90 tablet 3    omeprazole 20 MG Oral Capsule Delayed Release Take 1 capsule (20 mg total) by mouth every morning. 90 capsule 2    naproxen 500 MG Oral Tab Take 1 tablet (500 mg total) by mouth 2 (two) times daily before meals. 30 tablet 1    Sildenafil Citrate 100 MG Oral Tab  Take 1 tablet (100 mg total) by mouth daily as needed for Erectile Dysfunction. 10 tablet 0    ASPIRIN ADULT LOW STRENGTH 81 MG Oral Tab EC Take 1 tablet (81 mg total) by mouth daily.  90 tablet 0    gabapentin 600 MG Oral Tab Take 1 tablet (600 mg total) by mouth 2 (two) times daily. (Patient not taking: Reported on 2025) 180 tablet 3   [2]   Past Medical History:   Abnormal fasting glucose    Colon polyps    repeat CLN in 5-7 years    Essential hypertension    High blood pressure    High cholesterol    Hypercholesterolemia    Hyperlipidemia   [3]   Past Surgical History:  Procedure Laterality Date    Anterior cruciate ligament repair Right     Back surgery      l3-4-5    Colonoscopy N/A 2021    Procedure: COLONOSCOPY;  Surgeon: SHAMAR Ramírez MD;  Location: [facility] ENDOSCOPY    Other surgical history      deviated septum    Other surgical history Left     knee arthroscopy - meniscal tear    Tonsillectomy     [4]   Social History  Socioeconomic History    Marital status:     Number of children: 2   Tobacco Use    Smoking status: Former     Current packs/day: 0.00     Average packs/day: 0.8 packs/day for 14.0 years (10.5 ttl pk-yrs)     Types: Cigarettes     Start date: 1989     Quit date: 2003     Years since quittin.3    Smokeless tobacco: Never   Vaping Use    Vaping status: Never Used   Substance and Sexual Activity    Alcohol use: Yes     Alcohol/week: 5.0 standard drinks of alcohol     Types: 5 Glasses of wine per week     Comment: 5 drinks/week    Drug use: No     Social Drivers of Health     Food Insecurity: No Food Insecurity (2025)    NCSS - Food Insecurity     Worried About Running Out of Food in the Last Year: No     Ran Out of Food in the Last Year: No   Transportation Needs: No Transportation Needs (2025)    NCSS - Transportation     Lack of Transportation: No   Housing Stability: Not At Risk (2025)    NCSS - Housing/Utilities     Has Housing: Yes      Worried About Losing Housing: No     Unable to Get Utilities: No   [5]   Family History  Problem Relation Age of Onset    Cancer Father         Stomach CA    Hypertension Father     Heart Disorder Father         MI    Other (Other) Maternal Grandmother         Alzheimers    Other (Other) Paternal Grandmother         Alzheimers    Heart Disorder Brother         MI   [6] No Known Allergies

## 2025-04-29 ENCOUNTER — TELEPHONE (OUTPATIENT)
Dept: INTERNAL MEDICINE CLINIC | Facility: CLINIC | Age: 60
End: 2025-04-29

## 2025-04-29 NOTE — TELEPHONE ENCOUNTER
Approved    Prior authorization approved  Payer[facility] [place]: [id number]    [phone]    [phone]  Note from payer: Your PA request has been approved.  Additional information will be provided in the approval communication. (Message 1143)  Approval Details    Authorized from April 29, 2025 to May 29, 2025  Electronic appeal: Not supported  View History

## (undated) DEVICE — KIT CLEAN ENDOKIT 1.1OZ GOWNX2

## (undated) DEVICE — MASK PROC W/VISOR ANTIGLARE

## (undated) DEVICE — MEDI-VAC NON-CONDUCTIVE SUCTION TUBING 6MM X 1.8M (6FT.) L: Brand: CARDINAL HEALTH

## (undated) DEVICE — 35 ML SYRINGE REGULAR TIP: Brand: MONOJECT

## (undated) DEVICE — SNARE ENDOSCOPIC 10MM ROUND

## (undated) DEVICE — SNARE OPTMZ PLPCTM TRP

## (undated) DEVICE — LINE MNTR ADLT SET O2 INTMD

## (undated) DEVICE — KIT ENDO ORCAPOD 160/180/190

## (undated) NOTE — LETTER
Date & Time: 4/21/2025, 11:45 AM  Patient: Mukul Mendes  Encounter Provider(s):    Marilee Cordero MD       To Whom It May Concern:    Mukul Mendes was seen and treated in our department on 4/21/2025. He should not return to work until 4/27/2025 .    If you have any questions or concerns, please do not hesitate to call.        _____________________________  Physician/APC Signature

## (undated) NOTE — MR AVS SNAPSHOT
[facility] SPECIAL[facility] [place] [address] [address]  [phone]               Thank you for choosing us for your health care visit with Vickie Hernandez MD.  We are glad to serve you and happy to provide you with this summary o Your physician has referred you to a specialist.  Your physician or the clinic staff will provide you with the phone number you should call to schedule your appointment.      If you are confident that your benefit plan will not require a referral or authori Instructions and Information about Your Health     None      Allergies as of Jun 19, 2017     No Known Allergies                Today's Vital Signs     BP Pulse Height Weight BMI    149/94 mmHg 51 5' 4.25\" (1.632 m) 195 lb (88.451 kg) 33.21 kg/m2 Weight Reduction Maintain normal body weight (body mass index 18.5-24.9 kg/m2)   DASH eating plan Adopt a diet rich in fruits, vegetables, and low fat dairy products with reduced content of saturated and total fat.    Dietary sodium reduction Reduce dietary Don’t forget strength training with weights and resistance Set goals and track your progress   You don’t need to join a gym. Home exercises work great.  Put more priority on exercise in your life                    Visit [facility] online at

## (undated) NOTE — LETTER
01/03/18        Devan Maldonado [address]      Dear Varsha Cavazos records indicate that you have outstanding lab work and or testing that was ordered for you and has not yet been completed:          Hemoglobin A1C [E]